# Patient Record
Sex: FEMALE | Race: WHITE | NOT HISPANIC OR LATINO | Employment: OTHER | URBAN - NONMETROPOLITAN AREA
[De-identification: names, ages, dates, MRNs, and addresses within clinical notes are randomized per-mention and may not be internally consistent; named-entity substitution may affect disease eponyms.]

---

## 2017-07-11 ENCOUNTER — TELEPHONE (OUTPATIENT)
Dept: CARDIOLOGY | Facility: CLINIC | Age: 82
End: 2017-07-11

## 2017-07-11 NOTE — TELEPHONE ENCOUNTER
Patient called to ask when her next appointment is scheduled. Patient was told that it is scheduled for 7/27/17 at 2:45 pm. Patient verbalized understanding.

## 2017-07-27 ENCOUNTER — OFFICE VISIT (OUTPATIENT)
Dept: CARDIOLOGY | Facility: CLINIC | Age: 82
End: 2017-07-27

## 2017-07-27 VITALS
HEART RATE: 64 BPM | SYSTOLIC BLOOD PRESSURE: 130 MMHG | HEIGHT: 64 IN | WEIGHT: 148 LBS | DIASTOLIC BLOOD PRESSURE: 74 MMHG | BODY MASS INDEX: 25.27 KG/M2

## 2017-07-27 DIAGNOSIS — E78.49 OTHER HYPERLIPIDEMIA: ICD-10-CM

## 2017-07-27 DIAGNOSIS — Z95.2 S/P AVR: ICD-10-CM

## 2017-07-27 DIAGNOSIS — I10 ESSENTIAL HYPERTENSION: Primary | ICD-10-CM

## 2017-07-27 DIAGNOSIS — I35.0 NONRHEUMATIC AORTIC VALVE STENOSIS: ICD-10-CM

## 2017-07-27 PROBLEM — E78.5 HYPERLIPEMIA: Status: ACTIVE | Noted: 2017-07-27

## 2017-07-27 PROCEDURE — 99213 OFFICE O/P EST LOW 20 MIN: CPT | Performed by: NURSE PRACTITIONER

## 2017-07-27 NOTE — PROGRESS NOTES
Chief Complaint   Patient presents with   • Follow-up     Chest pain on Saturday, felt like indigestion.  Became SOB.  Contributed to being out in the heat at a family reunion.  Lasted about 15-20 minutes.    • Med Refill     PCP writes all her refills and moniter labs.        Cardiac Complaints  none      Subjective   Laurel Bianchi is a 82 y.o. female with a history of aortic stenosis who underwent aortic valve replacement in 2008.  Most recent echo was reported as stable in 2016. Today she returns to the office for a follow-up appointment and reports chest discomfort associated with meals and certain foods.  She had an episode last Saturday with chest pain and shortness of breath, but admits to eating foods that often cause reflux and chest discomfort. She says the event lasted for several minutes and then went away. She denies any concerns since that time and states she is able to do all her housework at home without concerns and does so without any problems.  Labs and refills she reports with PCP.      Cardiac History  Past Surgical History:   Procedure Laterality Date   • CARDIAC CATHETERIZATION  08/26/2008     Cath- Normal Coronaries. GUANAKITO- 1 Cm2, Mod AI, Dil AO    • CARDIOVASCULAR STRESS TEST  11/15/2011     L. Myoview- Negative    • CARDIOVASCULAR STRESS TEST  06/05/2013    L. Myoview- negative for ischemia    • CONVERTED (HISTORICAL) HOLTER  02/28/2012     Holter- AVG HR HR 65 BPM.    • ECHO - CONVERTED  08/25/2008    Echo- ([facility]) EF 65%. GUANAKITO- 0.8 Cm2    • ECHO - CONVERTED  06/02/2009    Echo- EF >60%, GUANAKITO 1.7cm2.    • ECHO - CONVERTED  09/14/2010     Echo- EF >60%. GUANAKITO 1.5cm2,    • ECHO - CONVERTED  10/12/2011    Echo- EF 65%. GUANAKITO- 1.5 Cm2    • ECHO - CONVERTED  02/14/2013    Echo- EF 65%. GUANAKITO- 1.5 Cm2    • ECHO - CONVERTED  05/12/2015    Echo- EF 65%, mod AS, mild to mod MR, RVSP 28 mmHg    • ECHO - CONVERTED  11/17/2016    EF 60-65%, mild AS (GUANAKITO 1.73), trace MR   • OTHER SURGICAL HISTORY  08/29/2008     AVR with #21 Pericardial Valve & AO Root replacment with 28mm hemashield graft    • OTHER SURGICAL HISTORY  09/04/2008     R-thoracentesis    • OTHER SURGICAL HISTORY  05/31/2013    Carotid U/S-no significant hemodynamic stenosis, < 50%   • OTHER SURGICAL HISTORY  2013    Telerhythmics- Sinus rhythm with ave HR 77 bpm.        Current Outpatient Prescriptions   Medication Sig Dispense Refill   • amLODIPine (NORVASC) 5 MG tablet Take 5 mg by mouth Daily.     • aspirin 81 MG EC tablet Take 81 mg by mouth Daily.     • calcitriol (ROCALTROL) 0.25 MCG capsule Take 0.25 mcg by mouth Daily.     • Calcium Carb-Cholecalciferol (CALCIUM + D3) 600-200 MG-UNIT tablet Take  by mouth 4 (Four) Times a Day.     • hydrochlorothiazide (HYDRODIURIL) 25 MG tablet Take 25 mg by mouth. Take 1/2 tab daily     • lisinopril (PRINIVIL,ZESTRIL) 20 MG tablet Take 20 mg by mouth Daily.     • LORazepam (ATIVAN) 0.5 MG tablet Take 0.5 mg by mouth 2 (Two) Times a Day As Needed for anxiety.     • meclizine 25 MG chewable tablet chewable tablet Chew 25 mg As Needed.     • metoprolol tartrate (LOPRESSOR) 50 MG tablet Take 50 mg by mouth Daily.     • Omega-3 Fatty Acids (FISH OIL) 1200 MG capsule delayed-release Take  by mouth Daily.     • omeprazole (priLOSEC) 40 MG capsule Take 40 mg by mouth Daily.     • rosuvastatin (CRESTOR) 10 MG tablet Take  by mouth Daily.       No current facility-administered medications for this visit.        Review of patient's allergies indicates no known allergies.    Past Medical History:   Diagnosis Date   • Abnormal PFT     3-19-09 PFT -- abnormal --- followed by Dr. Givens   • Abnormal PFT     7-07-09: PFT-- Mild restrictive vent. defect. Insig. BD response.   • Anxiety and depression    • History of skin cancer     resection of the nose.   • Hypercholesterolemia    • Hypertension    • Migraines    • Partial Thyroidectomy    • Syncope     with severe aortic stenosis, S/P AVR       Social History     Social History  "  • Marital status:      Spouse name: N/A   • Number of children: N/A   • Years of education: N/A     Occupational History   • Not on file.     Social History Main Topics   • Smoking status: Never Smoker   • Smokeless tobacco: Never Used   • Alcohol use No   • Drug use: No   • Sexual activity: Not on file     Other Topics Concern   • Not on file     Social History Narrative       Family History   Problem Relation Age of Onset   • Heart disease Mother    • Hypertension Mother    • Diabetes Mother    • Heart disease Father    • Other Father      lung problems   • Heart disease Other    • Hypertension Other    • Hyperlipidemia Other        Review of Systems   Constitution: Negative for malaise/fatigue and night sweats.   Cardiovascular: Negative for chest pain, dyspnea on exertion, near-syncope and palpitations.   Respiratory: Negative for cough and shortness of breath.    Musculoskeletal: Negative for arthritis and back pain.   Gastrointestinal: Positive for heartburn. Negative for anorexia, nausea and vomiting.   Neurological: Negative for dizziness, focal weakness and light-headedness.   Psychiatric/Behavioral: Negative for altered mental status and depression.       DiabetesNo  Thyroidnormal    Objective     /74  Pulse 64  Ht 64\" (162.6 cm)  Wt 148 lb (67.1 kg)  BMI 25.4 kg/m2    Physical Exam   Constitutional: She is oriented to person, place, and time. She appears well-developed and well-nourished.   HENT:   Head: Normocephalic and atraumatic.   Eyes: EOM are normal. Pupils are equal, round, and reactive to light.   Neck: Normal range of motion. Neck supple.   Cardiovascular: Normal rate and regular rhythm.    Murmur heard.  Pulmonary/Chest: Effort normal and breath sounds normal.   Abdominal: Soft.   Musculoskeletal: Normal range of motion.   Neurological: She is alert and oriented to person, place, and time.   Skin: Skin is warm and dry.   Psychiatric: She has a normal mood and affect. Her " behavior is normal.       Procedures    Assessment/Plan     HR and BP are stable.  No changes to meds will be made.  She ws encouraged to continue use of her prilosec for GERD management and to avoid foods she knows causes her concern.  Most recent echo findings discussed with patient.  No new cardiac testing will be advised at this time as no new concerns are voiced and she states doing well at home without concern.  Labs she reports with you, could we get next copy?  No refills are needed as she reports with you.  Good cardiac diet and activity as tolerated advised.  6 month follow up advised or sooner if needed.  If any problems should arise, patient advised to call the office.      Problems Addressed this Visit        Cardiovascular and Mediastinum    Essential hypertension - Primary    Hyperlipemia    AS (aortic stenosis)    S/P AVR                  Electronically signed by SILME Lei July 27, 2017 4:54 PM

## 2018-01-25 ENCOUNTER — OFFICE VISIT (OUTPATIENT)
Dept: CARDIOLOGY | Facility: CLINIC | Age: 83
End: 2018-01-25

## 2018-01-25 VITALS
SYSTOLIC BLOOD PRESSURE: 156 MMHG | HEIGHT: 64 IN | WEIGHT: 142 LBS | DIASTOLIC BLOOD PRESSURE: 70 MMHG | HEART RATE: 60 BPM | BODY MASS INDEX: 24.24 KG/M2

## 2018-01-25 DIAGNOSIS — R42 VERTIGO: ICD-10-CM

## 2018-01-25 DIAGNOSIS — E78.49 OTHER HYPERLIPIDEMIA: ICD-10-CM

## 2018-01-25 DIAGNOSIS — I35.0 NONRHEUMATIC AORTIC VALVE STENOSIS: ICD-10-CM

## 2018-01-25 DIAGNOSIS — K30 INDIGESTION: ICD-10-CM

## 2018-01-25 DIAGNOSIS — Z95.2 S/P AVR: ICD-10-CM

## 2018-01-25 DIAGNOSIS — I10 ESSENTIAL HYPERTENSION: Primary | ICD-10-CM

## 2018-01-25 PROCEDURE — 99214 OFFICE O/P EST MOD 30 MIN: CPT | Performed by: NURSE PRACTITIONER

## 2018-01-25 RX ORDER — ESOMEPRAZOLE MAGNESIUM 40 MG/1
40 CAPSULE, DELAYED RELEASE ORAL
COMMUNITY
End: 2020-08-19 | Stop reason: ALTCHOICE

## 2018-01-25 NOTE — PROGRESS NOTES
Chief Complaint   Patient presents with   • Follow-up     Patient presents for cardiac management. PCP refills meds and follows labs.    • Chest Pain     Has occasional stomach pain that she thinks is related to indigestion. Pain is relieved with tums.        Cardiac Complaints  none      Subjective   Laurel Bianchi is a 82 y.o. female with HTN, hyperlipidemia, and  a history of aortic stenosis who underwent aortic valve replacement in 2008.  Most recent echo in 2016 showed GUANAKITO stable at 1.7 cm2 and normal LV function. She returns today for follow up and states issues with pain under her ribs on the left side, which she states is left upper quadrant .  Patient reports it as gas pain as she states it goes quickly away with TUMS.  She denies chest pain, shortness of breath, and palpitations.  She does have some issues with what she says is dizziness from vertigo but patient says she has been doing exercise with PT in regards and says it has helped the dizziness a great deal.  Labs and refills she reports with PCP.  Patient thinks everything looked okay an no abnormalities were seen.  No refills are needed today.      Cardiac History  Past Surgical History:   Procedure Laterality Date   • CARDIAC CATHETERIZATION  08/26/2008     Cath- Normal Coronaries. GUANAKITO- 1 Cm2, Mod AI, Dil AO    • CARDIOVASCULAR STRESS TEST  11/15/2011     L. Myoview- Negative    • CARDIOVASCULAR STRESS TEST  06/05/2013    L. Myoview- negative for ischemia    • CONVERTED (HISTORICAL) HOLTER  02/28/2012     Holter- AVG HR HR 65 BPM.    • ECHO - CONVERTED  08/25/2008    Echo- ([facility]) EF 65%. GUANAKITO- 0.8 Cm2    • ECHO - CONVERTED  06/02/2009    Echo- EF >60%, GUANAKITO 1.7cm2.    • ECHO - CONVERTED  09/14/2010     Echo- EF >60%. GUANAKITO 1.5cm2,    • ECHO - CONVERTED  10/12/2011    Echo- EF 65%. GUANAKITO- 1.5 Cm2    • ECHO - CONVERTED  02/14/2013    Echo- EF 65%. GUANAKITO- 1.5 Cm2    • ECHO - CONVERTED  05/12/2015    Echo- EF 65%, mod AS, mild to mod MR, RVSP 28 mmHg    • ECHO  - CONVERTED  11/17/2016    EF 60-65%, mild AS (GUANAKITO 1.73), trace MR   • OTHER SURGICAL HISTORY  08/29/2008    AVR with #21 Pericardial Valve & AO Root replacment with 28mm hemashield graft    • OTHER SURGICAL HISTORY  09/04/2008     R-thoracentesis    • OTHER SURGICAL HISTORY  05/31/2013    Carotid U/S-no significant hemodynamic stenosis, < 50%   • OTHER SURGICAL HISTORY  2013    Telerhythmics- Sinus rhythm with ave HR 77 bpm.        Current Outpatient Prescriptions   Medication Sig Dispense Refill   • amLODIPine (NORVASC) 5 MG tablet Take 5 mg by mouth Daily.     • aspirin 81 MG EC tablet Take 81 mg by mouth Daily.     • calcitriol (ROCALTROL) 0.25 MCG capsule Take 0.25 mcg by mouth. Takes 2 capsules daily     • Calcium Carb-Cholecalciferol (CALCIUM + D3) 600-200 MG-UNIT tablet Take  by mouth 4 (Four) Times a Day.     • esomeprazole (nexIUM) 40 MG capsule Take 40 mg by mouth Every Morning Before Breakfast.     • hydrochlorothiazide (HYDRODIURIL) 25 MG tablet Take 25 mg by mouth. Take 1/2 tab daily     • lisinopril (PRINIVIL,ZESTRIL) 20 MG tablet Take 20 mg by mouth Daily.     • LORazepam (ATIVAN) 0.5 MG tablet Take 0.5 mg by mouth 2 (Two) Times a Day As Needed for anxiety.     • meclizine 25 MG chewable tablet chewable tablet Chew 25 mg As Needed.     • metoprolol tartrate (LOPRESSOR) 50 MG tablet Take 50 mg by mouth Daily.     • Omega-3 Fatty Acids (FISH OIL) 1200 MG capsule delayed-release Take  by mouth Daily.     • rosuvastatin (CRESTOR) 10 MG tablet Take  by mouth Daily.       No current facility-administered medications for this visit.        Review of patient's allergies indicates no known allergies.    Past Medical History:   Diagnosis Date   • Abnormal PFT     3-19-09 PFT -- abnormal --- followed by Dr. Givens   • Abnormal PFT     7-07-09: PFT-- Mild restrictive vent. defect. Insig. BD response.   • Anxiety and depression    • History of skin cancer     resection of the nose.   • Hypercholesterolemia   "  • Hypertension    • Migraines    • Partial Thyroidectomy    • Syncope     with severe aortic stenosis, S/P AVR       Social History     Social History   • Marital status:      Spouse name: N/A   • Number of children: N/A   • Years of education: N/A     Occupational History   • Not on file.     Social History Main Topics   • Smoking status: Never Smoker   • Smokeless tobacco: Never Used   • Alcohol use No   • Drug use: No   • Sexual activity: Not on file     Other Topics Concern   • Not on file     Social History Narrative       Family History   Problem Relation Age of Onset   • Heart disease Mother    • Hypertension Mother    • Diabetes Mother    • Heart disease Father    • Other Father      lung problems   • Heart disease Other    • Hypertension Other    • Hyperlipidemia Other        Review of Systems   Constitution: Negative for weakness and malaise/fatigue.   Cardiovascular: Negative for chest pain, dyspnea on exertion, near-syncope, palpitations and syncope.   Respiratory: Negative for shortness of breath and wheezing.    Musculoskeletal: Negative for joint pain and joint swelling.   Gastrointestinal: Positive for heartburn. Negative for anorexia, diarrhea, dysphagia and nausea.   Genitourinary: Negative for dysuria, hematuria and nocturia.   Neurological: Positive for dizziness. Negative for light-headedness and loss of balance.   Psychiatric/Behavioral: Negative for depression and memory loss. The patient is not nervous/anxious.        DiabetesNo  Thyroidnormal    Objective     /70 (BP Location: Left arm)  Pulse 60  Ht 162.6 cm (64\")  Wt 64.4 kg (142 lb)  BMI 24.37 kg/m2    Physical Exam   Constitutional: She is oriented to person, place, and time. She appears well-developed and well-nourished.   HENT:   Head: Normocephalic and atraumatic.   Eyes: EOM are normal. Pupils are equal, round, and reactive to light.   Neck: Normal range of motion. Neck supple.   Cardiovascular: Normal rate and " regular rhythm.    Murmur heard.  Pulmonary/Chest: Effort normal and breath sounds normal.   Abdominal: Soft.   Musculoskeletal: Normal range of motion.   Neurological: She is alert and oriented to person, place, and time.   Skin: Skin is warm and dry.   Psychiatric: She has a normal mood and affect. Her behavior is normal.       Procedures    Assessment/Plan     HR is stable today.  BP is high side of normal at 156/70.  Patient advised to increase her norvasc to 1 and 1/2 tablet daily if her blood pressure continues to be high.  She was urged to keep a log at home. Limited sodium intake advised, DASH diet information provided. Most recent echo showed GUANAKITO as stable at 1.7 cm2, at next visit we will consider echo to reassess her GUANAKITO and LV function.  For now, since no new or worsening concerns noted, no new cardiac workup advised.  If symptoms should arise, patient advised to call for further recommendations.  In regards to vertigo, she is currently following with PT in regards and states her exercises have helped with her dizziness. She continues to have indigestion and epigastric pain.  She was encouraged to discuss with you about Hpylori testing if it has not yet been done.  Labs are done with your office, could we have most recent copy for our records?  No refills needed.  Weight is down from last visit by 6 pounds, she states she has not been eating as much from stress and her vertigo.  She will discuss with you.  6 month follow up advised or sooner if needed.        Problems Addressed this Visit        Cardiovascular and Mediastinum    Essential hypertension - Primary    Hyperlipemia    AS (aortic stenosis)    S/P AVR      Other Visit Diagnoses     Vertigo        Indigestion                      Electronically signed by SLIME Lei January 26, 2018 8:42 AM

## 2018-07-26 ENCOUNTER — OFFICE VISIT (OUTPATIENT)
Dept: CARDIOLOGY | Facility: CLINIC | Age: 83
End: 2018-07-26

## 2018-07-26 VITALS
HEIGHT: 64 IN | SYSTOLIC BLOOD PRESSURE: 120 MMHG | BODY MASS INDEX: 24.07 KG/M2 | WEIGHT: 141 LBS | HEART RATE: 60 BPM | DIASTOLIC BLOOD PRESSURE: 70 MMHG

## 2018-07-26 DIAGNOSIS — I10 ESSENTIAL HYPERTENSION: ICD-10-CM

## 2018-07-26 DIAGNOSIS — I35.0 NONRHEUMATIC AORTIC VALVE STENOSIS: ICD-10-CM

## 2018-07-26 DIAGNOSIS — R01.1 CARDIAC MURMUR: Primary | ICD-10-CM

## 2018-07-26 DIAGNOSIS — E78.2 MIXED HYPERLIPIDEMIA: ICD-10-CM

## 2018-07-26 DIAGNOSIS — Z95.2 S/P AVR: ICD-10-CM

## 2018-07-26 DIAGNOSIS — R42 DIZZINESS: ICD-10-CM

## 2018-07-26 DIAGNOSIS — R06.02 SHORTNESS OF BREATH: ICD-10-CM

## 2018-07-26 DIAGNOSIS — I34.0 NON-RHEUMATIC MITRAL REGURGITATION: ICD-10-CM

## 2018-07-26 PROCEDURE — 99214 OFFICE O/P EST MOD 30 MIN: CPT | Performed by: NURSE PRACTITIONER

## 2018-07-26 NOTE — PROGRESS NOTES
Chief Complaint   Patient presents with   • Follow-up     For cardiac management. Labs per PCP about 3 months ago. Refills per PCP.    • Shortness of Breath     Same as before.        Subjective       Laurel Bianchi is a 83 y.o. female with HTN, hyperlipidemia, and  a history of aortic stenosis who underwent aortic valve replacement in 2008.  Most recent echo in 2016 showed GUANAKITO stable at 1.7 cm2 and normal LV function.  Today she comes the office for a follow up visit. She now lives where she can do more walking outside. She admits to shortness of breath with exertion. She also has issues with dizziness but unsure if related to vertigo. After treatment with physical therapy in the past her dizziness had improved but not subsided. No recent medication changes noted.     HPI     Cardiac History:    Past Surgical History:   Procedure Laterality Date   • CARDIAC CATHETERIZATION  08/26/2008     Cath- Normal Coronaries. GUANAKITO- 1 Cm2, Mod AI, Dil AO    • CARDIOVASCULAR STRESS TEST  11/15/2011     L. Myoview- Negative    • CARDIOVASCULAR STRESS TEST  06/05/2013    L. Myoview- negative for ischemia    • CONVERTED (HISTORICAL) HOLTER  02/28/2012     Holter- AVG HR HR 65 BPM.    • ECHO - CONVERTED  08/25/2008    Echo- ([facility]) EF 65%. GUANAKITO- 0.8 Cm2    • ECHO - CONVERTED  06/02/2009    Echo- EF >60%, GUANAKITO 1.7cm2.    • ECHO - CONVERTED  09/14/2010     Echo- EF >60%. GUANAKITO 1.5cm2,    • ECHO - CONVERTED  10/12/2011    Echo- EF 65%. GUANAKITO- 1.5 Cm2    • ECHO - CONVERTED  02/14/2013    Echo- EF 65%. GUANAKITO- 1.5 Cm2    • ECHO - CONVERTED  05/12/2015    Echo- EF 65%, mod AS, mild to mod MR, RVSP 28 mmHg    • ECHO - CONVERTED  11/17/2016    EF 60-65%, mild AS (GUANAKITO 1.73), trace MR   • OTHER SURGICAL HISTORY  08/29/2008    AVR with #21 Pericardial Valve & AO Root replacment with 28mm hemashield graft    • OTHER SURGICAL HISTORY  09/04/2008     R-thoracentesis    • OTHER SURGICAL HISTORY  05/31/2013    Carotid U/S-no significant hemodynamic stenosis, <  50%   • OTHER SURGICAL HISTORY  2013    Telerhythmics- Sinus rhythm with ave HR 77 bpm.        Current Outpatient Prescriptions   Medication Sig Dispense Refill   • amLODIPine (NORVASC) 5 MG tablet Take 5 mg by mouth Daily.     • aspirin 81 MG EC tablet Take 81 mg by mouth Daily.     • calcitriol (ROCALTROL) 0.25 MCG capsule Take 0.25 mcg by mouth. Takes 2 capsules daily     • Calcium Carb-Cholecalciferol (CALCIUM + D3) 600-200 MG-UNIT tablet Take  by mouth 4 (Four) Times a Day.     • esomeprazole (nexIUM) 40 MG capsule Take 40 mg by mouth Every Morning Before Breakfast.     • hydrochlorothiazide (HYDRODIURIL) 25 MG tablet Take 25 mg by mouth. Take 1/2 tab daily     • lisinopril (PRINIVIL,ZESTRIL) 20 MG tablet Take 20 mg by mouth Daily.     • LORazepam (ATIVAN) 0.5 MG tablet Take 0.5 mg by mouth 2 (Two) Times a Day As Needed for anxiety.     • metoprolol tartrate (LOPRESSOR) 50 MG tablet Take 50 mg by mouth Daily.     • Omega-3 Fatty Acids (FISH OIL) 1200 MG capsule delayed-release Take  by mouth Daily.     • rosuvastatin (CRESTOR) 10 MG tablet Take  by mouth Daily.       No current facility-administered medications for this visit.        Patient has no known allergies.    Past Medical History:   Diagnosis Date   • Abnormal PFT     3-19-09 PFT -- abnormal --- followed by Dr. Givens   • Abnormal PFT     7-07-09: PFT-- Mild restrictive vent. defect. Insig. BD response.   • Anxiety and depression    • History of skin cancer     resection of the nose.   • Hypercholesterolemia    • Hypertension    • Migraines    • Partial Thyroidectomy    • Syncope     with severe aortic stenosis, S/P AVR       Social History     Social History   • Marital status:      Spouse name: N/A   • Number of children: N/A   • Years of education: N/A     Occupational History   • Not on file.     Social History Main Topics   • Smoking status: Never Smoker   • Smokeless tobacco: Never Used   • Alcohol use No   • Drug use: No   • Sexual  "activity: Not on file     Other Topics Concern   • Not on file     Social History Narrative   • No narrative on file       Family History   Problem Relation Age of Onset   • Heart disease Mother    • Hypertension Mother    • Diabetes Mother    • Heart disease Father    • Other Father         lung problems   • Heart disease Other    • Hypertension Other    • Hyperlipidemia Other        Review of Systems   Constitution: Positive for malaise/fatigue (same, relates to age). Negative for decreased appetite.   HENT: Negative for congestion and nosebleeds.    Eyes: Negative for redness and visual disturbance.   Cardiovascular: Positive for chest pain (very rare \"little pain\" , not a new symptom). Negative for dyspnea on exertion, leg swelling and near-syncope.   Respiratory: Positive for shortness of breath.    Endocrine: Negative for polydipsia, polyphagia and polyuria.   Hematologic/Lymphatic: Negative for bleeding problem. Does not bruise/bleed easily.   Skin: Negative for dry skin and itching.   Musculoskeletal: Negative for falls, muscle cramps and muscle weakness.   Gastrointestinal: Negative for abdominal pain, heartburn, melena and nausea.   Genitourinary: Negative for dysuria and hematuria.   Neurological: Positive for vertigo (has had PT with benefit). Negative for headaches and light-headedness.   Psychiatric/Behavioral: Positive for memory loss (more forgetful). The patient is not nervous/anxious.         Objective     /70   Pulse 60   Ht 162.6 cm (64\")   Wt 64 kg (141 lb)   BMI 24.20 kg/m²     Physical Exam   Constitutional: She is oriented to person, place, and time. She appears well-developed and well-nourished.   HENT:   Head: Normocephalic.   Eyes: Pupils are equal, round, and reactive to light. Conjunctivae are normal.   Neck: Normal range of motion. Neck supple.   Cardiovascular: Normal rate, regular rhythm, S1 normal and S2 normal.    Murmur heard.   Medium-pitched blowing systolic murmur is " present with a grade of 3/6   Pulmonary/Chest: Breath sounds normal.   Abdominal: Soft. Bowel sounds are normal. There is no tenderness.   Musculoskeletal: Normal range of motion. She exhibits no edema.   Neurological: She is alert and oriented to person, place, and time.   Skin: Skin is warm and dry.   Psychiatric: She has a normal mood and affect. Her behavior is normal.      Procedures: none today        Assessment/Plan      Laurel was seen today for follow-up and shortness of breath.    Diagnoses and all orders for this visit:    Cardiac murmur  -     Adult Transthoracic Echo Complete W/ Cont if Necessary Per Protocol; Future    Non-rheumatic mitral regurgitation    S/P AVR  -     Adult Transthoracic Echo Complete W/ Cont if Necessary Per Protocol; Future    Nonrheumatic aortic valve stenosis  -     Adult Transthoracic Echo Complete W/ Cont if Necessary Per Protocol; Future    Essential hypertension  -     Adult Transthoracic Echo Complete W/ Cont if Necessary Per Protocol; Future    Mixed hyperlipidemia    Dizziness  -     Adult Transthoracic Echo Complete W/ Cont if Necessary Per Protocol; Future    Shortness of breath  -     Adult Transthoracic Echo Complete W/ Cont if Necessary Per Protocol; Future     Her blood pressure today is normal. Heart rate and rhythm are normal. Continue same dose Lopressor, lisinopril, HCTZ and Norvasc. No refills needed today.     Thank you for sending a copy of lab report. Her lipids are well controlled. LFT normal. Continue same dose Crestor.     Patient's Body mass index is 24.2 kg/m². BMI is within normal parameters. No follow-up required. Heart healthy diet encouraged.     Due to shortness of breath and dizziness and history of AVR, a repeat echocardiogram ordered to reassess valvular structures and overall LV function.     A 6 month follow up scheduled.            Electronically signed by SLIME Alvarez,  July 26, 2018 6:21 PM

## 2018-08-02 ENCOUNTER — HOSPITAL ENCOUNTER (OUTPATIENT)
Dept: CARDIOLOGY | Facility: HOSPITAL | Age: 83
Discharge: HOME | End: 2018-08-02
Admitting: NURSE PRACTITIONER

## 2018-08-02 DIAGNOSIS — R06.02 SHORTNESS OF BREATH: ICD-10-CM

## 2018-08-02 DIAGNOSIS — R42 DIZZINESS: ICD-10-CM

## 2018-08-02 DIAGNOSIS — I35.0 NONRHEUMATIC AORTIC VALVE STENOSIS: ICD-10-CM

## 2018-08-02 DIAGNOSIS — I10 ESSENTIAL HYPERTENSION: ICD-10-CM

## 2018-08-02 DIAGNOSIS — R01.1 CARDIAC MURMUR: ICD-10-CM

## 2018-08-02 DIAGNOSIS — Z95.2 S/P AVR: ICD-10-CM

## 2018-08-02 LAB
MAXIMAL PREDICTED HEART RATE: 137 BPM
STRESS TARGET HR: 116 BPM

## 2018-08-02 PROCEDURE — 93306 TTE W/DOPPLER COMPLETE: CPT

## 2018-08-02 PROCEDURE — 93306 TTE W/DOPPLER COMPLETE: CPT | Performed by: INTERNAL MEDICINE

## 2019-01-28 ENCOUNTER — OFFICE VISIT (OUTPATIENT)
Dept: CARDIOLOGY | Facility: CLINIC | Age: 84
End: 2019-01-28

## 2019-01-28 VITALS
BODY MASS INDEX: 24.59 KG/M2 | HEIGHT: 64 IN | WEIGHT: 144 LBS | SYSTOLIC BLOOD PRESSURE: 124 MMHG | DIASTOLIC BLOOD PRESSURE: 60 MMHG | HEART RATE: 64 BPM

## 2019-01-28 DIAGNOSIS — I10 ESSENTIAL HYPERTENSION: ICD-10-CM

## 2019-01-28 DIAGNOSIS — R42 DIZZINESS: ICD-10-CM

## 2019-01-28 DIAGNOSIS — I35.0 NONRHEUMATIC AORTIC VALVE STENOSIS: Primary | ICD-10-CM

## 2019-01-28 DIAGNOSIS — Z95.2 S/P AVR: ICD-10-CM

## 2019-01-28 DIAGNOSIS — E78.2 MIXED HYPERLIPIDEMIA: ICD-10-CM

## 2019-01-28 PROCEDURE — 99213 OFFICE O/P EST LOW 20 MIN: CPT | Performed by: NURSE PRACTITIONER

## 2019-01-28 NOTE — PROGRESS NOTES
"Chief Complaint   Patient presents with   • Follow-up     Cardiac management. She thinks she had labs 3 months ago. PCP writes refills.   • Chest Pain     Having some aching pressure to mid/left chest area, she thinks could be indigestion.   • Palpitations     Having some occasional, not often.   • Dizziness     She states \"I stay dizzy all the time\".        Subjective       Laurel Bianchi is a 83 y.o. female with HTN, hyperlipidemia, and  a history of aortic stenosis who underwent aortic valve replacement in 2008.  Echo in 2016 showed GUANAKITO stable at 1.7 cm2 and normal LV function. On 8/2/18, echo was repeated due to dizziness and shortness of breath. LVEF 55-60%, bioprosthetic pericardial aortic valve with normal function, GUANAKITO 1.3 cm2 and  Mild MVP noted. RVSP was 32 mmHg.     Today she comes to the office for a follow up visit and no cardiac concerns are voiced. She has dizziness, which improved after physical therapy treatment for vertigo. No exertional chest pain or increased shortness of breath noted. Rarely feels brief palpitations.      HPI     Cardiac History:    Past Surgical History:   Procedure Laterality Date   • CARDIAC CATHETERIZATION  08/26/2008     Cath- Normal Coronaries. GUANAKITO- 1 Cm2, Mod AI, Dil AO    • CARDIOVASCULAR STRESS TEST  11/15/2011     L. Myoview- Negative    • CARDIOVASCULAR STRESS TEST  06/05/2013    L. Myoview- negative for ischemia    • CONVERTED (HISTORICAL) HOLTER  02/28/2012     Holter- AVG HR HR 65 BPM.    • ECHO - CONVERTED  08/25/2008    Echo- ([facility]) EF 65%. GUANAKITO- 0.8 Cm2    • ECHO - CONVERTED  06/02/2009    Echo- EF >60%, GUANAKITO 1.7cm2.    • ECHO - CONVERTED  09/14/2010     Echo- EF >60%. GUANAKITO 1.5cm2,    • ECHO - CONVERTED  10/12/2011    Echo- EF 65%. GUANAKITO- 1.5 Cm2    • ECHO - CONVERTED  02/14/2013    Echo- EF 65%. GUANAKITO- 1.5 Cm2    • ECHO - CONVERTED  05/12/2015    Echo- EF 65%, mod AS, mild to mod MR, RVSP 28 mmHg    • ECHO - CONVERTED  11/17/2016    EF 60-65%, mild AS (GUANAKITO 1.73), trace " MR   • ECHO - CONVERTED  08/02/2018    EF 55-60%. GUANAKITO- 1.3 Cm2. RVSP- 33 mmHg.   • OTHER SURGICAL HISTORY  08/29/2008    AVR with #21 Pericardial Valve & AO Root replacment with 28mm hemashield graft    • OTHER SURGICAL HISTORY  09/04/2008     R-thoracentesis    • OTHER SURGICAL HISTORY  05/31/2013    Carotid U/S-no significant hemodynamic stenosis, < 50%   • OTHER SURGICAL HISTORY  2013    Telerhythmics- Sinus rhythm with ave HR 77 bpm.        Current Outpatient Medications   Medication Sig Dispense Refill   • amLODIPine (NORVASC) 5 MG tablet Take 5 mg by mouth Daily.     • aspirin 81 MG EC tablet Take 81 mg by mouth Daily.     • calcitriol (ROCALTROL) 0.25 MCG capsule Takes 2 capsules daily     • Calcium Carb-Cholecalciferol (CALCIUM + D3) 600-200 MG-UNIT tablet Take  by mouth 4 (Four) Times a Day.     • esomeprazole (nexIUM) 40 MG capsule Take 40 mg by mouth Every Morning Before Breakfast.     • hydrochlorothiazide (HYDRODIURIL) 25 MG tablet Take 25 mg by mouth Daily.     • lisinopril (PRINIVIL,ZESTRIL) 20 MG tablet Take 20 mg by mouth Daily.     • LORazepam (ATIVAN) 0.5 MG tablet Take 0.5 mg by mouth 2 (Two) Times a Day As Needed for anxiety.     • metoprolol tartrate (LOPRESSOR) 50 MG tablet Take 50 mg by mouth Daily.     • Omega-3 Fatty Acids (FISH OIL) 1200 MG capsule delayed-release Take  by mouth Daily.     • rosuvastatin (CRESTOR) 10 MG tablet Take  by mouth Daily.       No current facility-administered medications for this visit.        Patient has no known allergies.    Past Medical History:   Diagnosis Date   • Abnormal PFT     3-19-09 PFT -- abnormal --- followed by Dr. Givens   • Abnormal PFT     7-07-09: PFT-- Mild restrictive vent. defect. Insig. BD response.   • Anxiety and depression    • History of skin cancer     resection of the nose.   • Hypercholesterolemia    • Hypertension    • Migraines    • Partial Thyroidectomy    • Syncope     with severe aortic stenosis, S/P AVR       Social History      Socioeconomic History   • Marital status:      Spouse name: Not on file   • Number of children: Not on file   • Years of education: Not on file   • Highest education level: Not on file   Social Needs   • Financial resource strain: Not on file   • Food insecurity - worry: Not on file   • Food insecurity - inability: Not on file   • Transportation needs - medical: Not on file   • Transportation needs - non-medical: Not on file   Occupational History   • Not on file   Tobacco Use   • Smoking status: Never Smoker   • Smokeless tobacco: Never Used   Substance and Sexual Activity   • Alcohol use: No   • Drug use: No   • Sexual activity: Not on file   Other Topics Concern   • Not on file   Social History Narrative   • Not on file       Family History   Problem Relation Age of Onset   • Heart disease Mother    • Hypertension Mother    • Diabetes Mother    • Heart disease Father    • Other Father         lung problems   • Heart disease Other    • Hypertension Other    • Hyperlipidemia Other        Review of Systems   Constitution: Negative for decreased appetite and malaise/fatigue.   HENT: Negative for congestion, hoarse voice and nosebleeds.    Eyes: Negative for blurred vision and redness.   Cardiovascular: Positive for palpitations. Negative for chest pain and leg swelling.   Respiratory: Negative for shortness of breath and snoring.    Endocrine: Negative for polydipsia, polyphagia and polyuria.   Hematologic/Lymphatic: Negative for adenopathy. Does not bruise/bleed easily.   Skin: Negative for color change, dry skin and itching.   Musculoskeletal: Negative for falls, muscle cramps and myalgias.   Gastrointestinal: Positive for heartburn (mild, not often). Negative for abdominal pain, change in bowel habit, dysphagia, melena and nausea.   Genitourinary: Negative for dysuria (awhile back, not recent) and hematuria.   Neurological: Positive for vertigo (better since had PT). Negative for dizziness and  "light-headedness.   Psychiatric/Behavioral: Negative for altered mental status. The patient does not have insomnia.    Allergic/Immunologic: Negative for environmental allergies and hives.        Objective     /60 (BP Location: Right arm)   Pulse 64   Ht 162.6 cm (64.02\")   Wt 65.3 kg (144 lb)   BMI 24.71 kg/m²     Physical Exam   Constitutional: She is oriented to person, place, and time. She appears well-nourished.   HENT:   Head: Normocephalic.   Eyes: Pupils are equal, round, and reactive to light.   Neck: Normal range of motion. No JVD present. Carotid bruit is not present.   Cardiovascular: Normal rate, regular rhythm, S1 normal, S2 normal and normal pulses.   Murmur heard.   Harsh midsystolic murmur is present with a grade of 3/6 at the upper right sternal border radiating to the neck.  Pulmonary/Chest: Breath sounds normal. She has no rales.   Abdominal: Soft. Bowel sounds are normal. She exhibits no distension. There is no tenderness.   Musculoskeletal: Normal range of motion. She exhibits no edema.   Neurological: She is alert and oriented to person, place, and time.   Skin: Skin is warm and dry. No pallor.   Psychiatric: She has a normal mood and affect. Her speech is normal and behavior is normal.        Procedures:none today        Assessment/Plan      Laurel was seen today for follow-up, chest pain, palpitations and dizziness.    Diagnoses and all orders for this visit:    Nonrheumatic aortic valve stenosis    S/P AVR    Essential hypertension    Mixed hyperlipidemia    Dizziness      The report of her echocardiogram done in August 2018 was reveiwed which showed normal LVEF, mild MVP and prosthetic aortic valve with GUANAKITO 1.3 cm2. Ms. Bianchi denies new or worsening symptoms. Dizziness remains improved after treatment per PT in the past. No repeat cardiac testing advised today.     Her blood pressure, heart rate and rhythm are normal. Continue same cardiac medications. No refills needed.     She " follow with you for medication refills and lab orders.     For management of lipids, she is taking Crestor without issues noted. Continue same and she will follow with you for recommendations regarding statin therapy.     Patient's Body mass index is 24.71 kg/m². BMI is within normal parameters. No follow-up required..    A 6 month follow up visit scheduled. Please call sooner for any cardiac concerns.             Electronically signed by SLIME Alvarez,  January 28, 2019 3:50 PM

## 2019-07-29 ENCOUNTER — OFFICE VISIT (OUTPATIENT)
Dept: CARDIOLOGY | Facility: CLINIC | Age: 84
End: 2019-07-29

## 2019-07-29 ENCOUNTER — CLINICAL SUPPORT (OUTPATIENT)
Dept: CARDIOLOGY | Facility: CLINIC | Age: 84
End: 2019-07-29

## 2019-07-29 DIAGNOSIS — I35.0 AORTIC VALVE STENOSIS, ETIOLOGY OF CARDIAC VALVE DISEASE UNSPECIFIED: ICD-10-CM

## 2019-07-29 DIAGNOSIS — R00.2 PALPITATIONS: ICD-10-CM

## 2019-07-29 DIAGNOSIS — Z95.2 S/P AVR: ICD-10-CM

## 2019-07-29 DIAGNOSIS — I10 ESSENTIAL HYPERTENSION: Primary | ICD-10-CM

## 2019-07-29 DIAGNOSIS — E78.00 HYPERCHOLESTEREMIA: ICD-10-CM

## 2019-07-29 PROBLEM — E78.5 HYPERLIPEMIA: Status: RESOLVED | Noted: 2017-07-27 | Resolved: 2019-07-29

## 2019-07-29 PROCEDURE — 0296T PR EXT ECG > 48HR TO 21 DAY RCRD W/CONECT INTL RCRD: CPT | Performed by: INTERNAL MEDICINE

## 2019-07-29 PROCEDURE — 99214 OFFICE O/P EST MOD 30 MIN: CPT | Performed by: INTERNAL MEDICINE

## 2019-07-29 NOTE — PROGRESS NOTES
Chief Complaint   Patient presents with   • Follow-up     for cardiac management   • Chest Pain     exertional chest pain, mid chest that radiates up into neck, choking sensation noted,    • Palpitations     randomly occurs once every 2-3 weeks   • Med Refill     PCP writes   • Labs     PCP checked 7/19/19, started K+       CARDIAC COMPLAINTS  palpitations        Subjective   Laurel Bianchi is a 84 y.o. female came in today for her follow-up visit.  She has history of aortic valvular stenosis diagnosed in 2008 who also had a dilated aortic root.  She underwent aortic root replacement along with aortic valve replacement using a pericardial valve.  Her last echocardiogram which was done about a year ago showed that the valve is still around 1.3 cm² which is about mild to moderate stenosis.  She came today stating that she has been noticing some chest pain radiating up into the neck the form of choking sensation.  It occurs mostly when she started noticing palpitation and then the symptoms occurs it occurs randomly may be every other week.  It occurs more when she exerted herself.  She apparently had labs done few weeks ago and was told that the potassium was low and supplement was added.  I do not have any other results and I am not sure whether the thyroid was checked.              Cardiac History  Past Surgical History:   Procedure Laterality Date   • CARDIAC CATHETERIZATION  08/26/2008     Cath- Normal Coronaries. GUANAKITO- 1 Cm2, Mod AI, Dil AO    • CARDIOVASCULAR STRESS TEST  11/15/2011     L. Myoview- Negative    • CARDIOVASCULAR STRESS TEST  06/05/2013    L. Myoview- negative for ischemia    • CONVERTED (HISTORICAL) HOLTER  02/28/2012     Holter- AVG HR HR 65 BPM.    • ECHO - CONVERTED  08/25/2008    Echo- ([facility]) EF 65%. GUANAKITO- 0.8 Cm2    • ECHO - CONVERTED  06/02/2009    Echo- EF >60%, GUANAKITO 1.7cm2.    • ECHO - CONVERTED  09/14/2010     Echo- EF >60%. GUANAKITO 1.5cm2,    • ECHO - CONVERTED  10/12/2011    Echo- EF 65%. GUANAKITO-  1.5 Cm2    • ECHO - CONVERTED  02/14/2013    Echo- EF 65%. GUANAKITO- 1.5 Cm2    • ECHO - CONVERTED  05/12/2015    Echo- EF 65%, mod AS, mild to mod MR, RVSP 28 mmHg    • ECHO - CONVERTED  11/17/2016    EF 60-65%, mild AS (GUANAKITO 1.73), trace MR   • ECHO - CONVERTED  08/02/2018    EF 55-60%. GUANAKITO- 1.3 Cm2. RVSP- 33 mmHg.   • OTHER SURGICAL HISTORY  08/29/2008    AVR with #21 Pericardial Valve & AO Root replacment with 28mm hemashield graft    • OTHER SURGICAL HISTORY  09/04/2008     R-thoracentesis    • OTHER SURGICAL HISTORY  05/31/2013    Carotid U/S-no significant hemodynamic stenosis, < 50%   • OTHER SURGICAL HISTORY  2013    Telerhythmics- Sinus rhythm with ave HR 77 bpm.        Current Outpatient Medications   Medication Sig Dispense Refill   • amLODIPine (NORVASC) 5 MG tablet Take 5 mg by mouth Daily.     • aspirin 81 MG EC tablet Take 81 mg by mouth Daily.     • calcitriol (ROCALTROL) 0.25 MCG capsule Takes 1 capsule daily     • Calcium Carb-Cholecalciferol (CALCIUM + D3) 600-200 MG-UNIT tablet Take  by mouth Daily.     • esomeprazole (nexIUM) 40 MG capsule Take 40 mg by mouth Every Morning Before Breakfast.     • hydrochlorothiazide (HYDRODIURIL) 25 MG tablet Take 25 mg by mouth Daily.     • lisinopril (PRINIVIL,ZESTRIL) 20 MG tablet Take 20 mg by mouth Daily.     • LORazepam (ATIVAN) 0.5 MG tablet Take 0.5 mg by mouth 2 (Two) Times a Day As Needed for anxiety.     • metoprolol tartrate (LOPRESSOR) 50 MG tablet Take 50 mg by mouth Daily.     • Omega-3 Fatty Acids (FISH OIL) 1200 MG capsule delayed-release Take  by mouth Daily.     • potassium chloride (K-DUR) 10 MEQ CR tablet Take 10 mEq by mouth Every Other Day.     • rosuvastatin (CRESTOR) 10 MG tablet Take  by mouth Daily.       No current facility-administered medications for this visit.        Allergies  :  Patient has no known allergies.       Past Medical History:   Diagnosis Date   • Abnormal PFT     3-19-09 PFT -- abnormal --- followed by Dr. Givens   •  Abnormal PFT     7-07-09: PFT-- Mild restrictive vent. defect. Insig. BD response.   • Anxiety and depression    • History of skin cancer     resection of the nose.   • Hypercholesterolemia    • Hypertension    • Migraines    • Partial Thyroidectomy    • Syncope     with severe aortic stenosis, S/P AVR       Social History     Socioeconomic History   • Marital status:      Spouse name: Not on file   • Number of children: Not on file   • Years of education: Not on file   • Highest education level: Not on file   Tobacco Use   • Smoking status: Never Smoker   • Smokeless tobacco: Never Used   Substance and Sexual Activity   • Alcohol use: No   • Drug use: No       Family History   Problem Relation Age of Onset   • Heart disease Mother    • Hypertension Mother    • Diabetes Mother    • Heart disease Father    • Other Father         lung problems   • Heart disease Other    • Hypertension Other    • Hyperlipidemia Other        Review of Systems   Constitution: Negative for decreased appetite and malaise/fatigue.   HENT: Negative for congestion and sore throat.    Eyes: Negative for blurred vision.   Cardiovascular: Positive for chest pain, irregular heartbeat and palpitations.   Respiratory: Negative for shortness of breath and snoring.    Endocrine: Negative for cold intolerance and heat intolerance.   Hematologic/Lymphatic: Negative for adenopathy. Does not bruise/bleed easily.   Skin: Negative for itching, nail changes and skin cancer.   Musculoskeletal: Negative for arthritis and myalgias.   Gastrointestinal: Negative for abdominal pain, dysphagia and heartburn.   Genitourinary: Negative for bladder incontinence and frequency.   Neurological: Negative for dizziness, light-headedness, seizures and vertigo.   Psychiatric/Behavioral: Negative for altered mental status.   Allergic/Immunologic: Negative for environmental allergies and hives.       Diabetes- No  Thyroid- normal    Objective     /58   Pulse 72   " Ht 162.6 cm (64\")   Wt 63.5 kg (140 lb)   BMI 24.03 kg/m²     Physical Exam   Constitutional: She is oriented to person, place, and time. She appears well-developed and well-nourished.   HENT:   Head: Normocephalic.   Nose: Nose normal.   Eyes: EOM are normal. Pupils are equal, round, and reactive to light.   Neck: Normal range of motion. Neck supple.   Cardiovascular: Normal rate, regular rhythm, S1 normal and S2 normal.   Murmur heard.  Pulmonary/Chest: Effort normal and breath sounds normal.   Abdominal: Soft. Bowel sounds are normal.   Musculoskeletal: Normal range of motion. She exhibits no edema.   Neurological: She is alert and oriented to person, place, and time.   Skin: Skin is warm and dry.   Psychiatric: She has a normal mood and affect.     Procedures            Assessment/Plan   Patient's Body mass index is 24.03 kg/m². BMI is within normal parameters. No follow-up required..     Laurel was seen today for follow-up, chest pain, palpitations, med refill and labs.    Diagnoses and all orders for this visit:    Essential hypertension    Hypercholesteremia    Aortic valve stenosis, etiology of cardiac valve disease unspecified    S/P AVR    Palpitations  -     Holter Monitor - 72 Hour Up To 21 Days; Future       At baseline her heart rate and blood pressure appears stable.  Her BMI is still within normal limits.  Her clinical examination reveals slightly loud second heart sound short systolic murmur at the mitral and aortic area.  She does not have any leg edema at this time.  We will continue the ACE inhibitors along with the beta-blockers and the calcium channel blockers for her hypertension.  Her hypercholesterolemia is being treated with Crestor so we will continue the same.  Her palpitation could be related to low calcium level.  She needs her magnesium level checked also along with a TSH.  I explained to her about the last echocardiogram showed the aortic stenosis is only mild to moderate not " significant enough to cause any discomfort.  I am going to place a 14-day Holter monitor on her to evaluate for the arrhythmia.  Based on the results of the palpitation she may or may not need 1C antiarrhythmic medication.  I will really appreciate if you can send me copy of her labs.                  Electronically signed by Darío Esposito MD July 30, 2019 3:33 PM

## 2019-07-30 VITALS
BODY MASS INDEX: 23.9 KG/M2 | SYSTOLIC BLOOD PRESSURE: 128 MMHG | WEIGHT: 140 LBS | HEIGHT: 64 IN | HEART RATE: 72 BPM | DIASTOLIC BLOOD PRESSURE: 58 MMHG

## 2019-07-30 RX ORDER — POTASSIUM CHLORIDE 750 MG/1
10 TABLET, FILM COATED, EXTENDED RELEASE ORAL EVERY OTHER DAY
COMMUNITY

## 2019-08-19 ENCOUNTER — TELEPHONE (OUTPATIENT)
Dept: CARDIOLOGY | Facility: CLINIC | Age: 84
End: 2019-08-19

## 2019-08-19 ENCOUNTER — OUTSIDE FACILITY SERVICE (OUTPATIENT)
Dept: CARDIOLOGY | Facility: CLINIC | Age: 84
End: 2019-08-19

## 2019-08-19 PROCEDURE — 0298T PR EXT ECG > 48HR TO 21 DAY REVIEW AND INTERPRETATN: CPT | Performed by: INTERNAL MEDICINE

## 2019-08-19 RX ORDER — METOPROLOL TARTRATE 50 MG/1
25 TABLET, FILM COATED ORAL 2 TIMES DAILY
Qty: 30 TABLET | Refills: 0
Start: 2019-08-19 | End: 2020-08-19

## 2020-02-19 ENCOUNTER — OFFICE VISIT (OUTPATIENT)
Dept: CARDIOLOGY | Facility: CLINIC | Age: 85
End: 2020-02-19

## 2020-02-19 VITALS
HEIGHT: 64 IN | WEIGHT: 139 LBS | DIASTOLIC BLOOD PRESSURE: 70 MMHG | BODY MASS INDEX: 23.73 KG/M2 | SYSTOLIC BLOOD PRESSURE: 136 MMHG | HEART RATE: 64 BPM

## 2020-02-19 DIAGNOSIS — Z95.2 S/P AVR: ICD-10-CM

## 2020-02-19 DIAGNOSIS — R07.89 OTHER CHEST PAIN: ICD-10-CM

## 2020-02-19 DIAGNOSIS — I35.0 AORTIC VALVE STENOSIS, ETIOLOGY OF CARDIAC VALVE DISEASE UNSPECIFIED: ICD-10-CM

## 2020-02-19 DIAGNOSIS — E78.00 HYPERCHOLESTEREMIA: ICD-10-CM

## 2020-02-19 DIAGNOSIS — I10 ESSENTIAL HYPERTENSION: ICD-10-CM

## 2020-02-19 DIAGNOSIS — R01.1 HEART MURMUR: ICD-10-CM

## 2020-02-19 DIAGNOSIS — R00.2 PALPITATIONS: ICD-10-CM

## 2020-02-19 DIAGNOSIS — R53.83 OTHER FATIGUE: ICD-10-CM

## 2020-02-19 PROCEDURE — 99214 OFFICE O/P EST MOD 30 MIN: CPT | Performed by: NURSE PRACTITIONER

## 2020-02-19 NOTE — PROGRESS NOTES
Chief Complaint   Patient presents with   • Follow-up     For cardiac management. Patient is on aspirin. Last lab work was done about 4 months ago per PCP, not in chart. States that she has had some chest pain on the left side, kind of makes her feel like she wants to choke. States that she occasionally has palpitations.    • Med Refill     PCP does medication refills. Brought medications with visit.        Subjective       Laurel Bianchi is a 85 y.o. female history of aortic valvular stenosis diagnosed in 2008 who also had a dilated aortic root.  She underwent aortic root replacement along with aortic valve replacement using a pericardial valve.  Her echocardiogram in 2018 showed that the valve is still around 1.3 cm², mild to moderate stenosis. In August 2019 cardiac monitor was placed due to palpitations. Short runs of SVT noted, mostly in evenings. Advised to change Lopressor 50 mg daily to 25 mg bid.     Today she comes to the office for a follow up visit. Palpitations, that occurred mostly in the evenings have improved since taking Lopressor twice a day. She admits to maninder mild episodes of chest discomfort. Shortness of breath on exertion is no worse. She denies swelling of her abdomen or lower legs. No dizziness or near syncope noted. No recent change in cardiac medications noted. She continues to live alone and maintains her ADL without issues but does have a house keeper to help with cleaning etc.     HPI     Cardiac History:    Past Surgical History:   Procedure Laterality Date   • CARDIAC CATHETERIZATION  08/26/2008     Cath- Normal Coronaries. GUANAKITO- 1 Cm2, Mod AI, Dil AO    • CARDIOVASCULAR STRESS TEST  11/15/2011     L. Myoview- Negative    • CARDIOVASCULAR STRESS TEST  06/05/2013    L. Myoview- negative for ischemia    • CONVERTED (HISTORICAL) HOLTER  02/28/2012     Holter- AVG HR HR 65 BPM.    • CONVERTED (HISTORICAL) HOLTER  08/19/2019    14 days. AVG-57. . 23 runs of SVT. Longest- 20 beats   •  ECHO - CONVERTED  08/25/2008    Echo- ([facility]) EF 65%. GUANAKITO- 0.8 Cm2    • ECHO - CONVERTED  06/02/2009    Echo- EF >60%, GUANAKITO 1.7cm2.    • ECHO - CONVERTED  09/14/2010     Echo- EF >60%. GUANAKITO 1.5cm2,    • ECHO - CONVERTED  10/12/2011    Echo- EF 65%. GUANAKITO- 1.5 Cm2    • ECHO - CONVERTED  02/14/2013    Echo- EF 65%. GUANAKITO- 1.5 Cm2    • ECHO - CONVERTED  05/12/2015    Echo- EF 65%, mod AS, mild to mod MR, RVSP 28 mmHg    • ECHO - CONVERTED  11/17/2016    EF 60-65%, mild AS (GUANAKITO 1.73), trace MR   • ECHO - CONVERTED  08/02/2018    EF 55-60%. GUANAKITO- 1.3 Cm2. RVSP- 33 mmHg.   • OTHER SURGICAL HISTORY  08/29/2008    AVR with #21 Pericardial Valve & AO Root replacment with 28mm hemashield graft    • OTHER SURGICAL HISTORY  09/04/2008     R-thoracentesis    • OTHER SURGICAL HISTORY  05/31/2013    Carotid U/S-no significant hemodynamic stenosis, < 50%   • OTHER SURGICAL HISTORY  2013    Telerhythmics- Sinus rhythm with ave HR 77 bpm.        Current Outpatient Medications   Medication Sig Dispense Refill   • amLODIPine (NORVASC) 5 MG tablet Take 5 mg by mouth Daily.     • aspirin 81 MG EC tablet Take 81 mg by mouth Daily.     • calcitriol (ROCALTROL) 0.25 MCG capsule Takes 2 capsule daily     • Calcium Carb-Cholecalciferol (CALCIUM + D3) 600-200 MG-UNIT tablet Take  by mouth Daily.     • esomeprazole (nexIUM) 40 MG capsule Take 40 mg by mouth Every Morning Before Breakfast.     • hydrochlorothiazide (HYDRODIURIL) 25 MG tablet Take 25 mg by mouth Daily.     • lisinopril (PRINIVIL,ZESTRIL) 20 MG tablet Take 20 mg by mouth Daily.     • LORazepam (ATIVAN) 0.5 MG tablet Take 0.5 mg by mouth 2 (Two) Times a Day As Needed for anxiety.     • metoprolol tartrate (LOPRESSOR) 50 MG tablet Take 0.5 tablets by mouth 2 (Two) Times a Day. 30 tablet 0   • Omega-3 Fatty Acids (FISH OIL) 1200 MG capsule delayed-release Take  by mouth Daily.     • potassium chloride (K-DUR) 10 MEQ CR tablet Take 10 mEq by mouth Every Other Day.     • rosuvastatin (CRESTOR)  10 MG tablet Take  by mouth Daily.       No current facility-administered medications for this visit.        Patient has no known allergies.    Past Medical History:   Diagnosis Date   • Abnormal PFT     3-19-09 PFT -- abnormal --- followed by Dr. Givens   • Abnormal PFT     7-07-09: PFT-- Mild restrictive vent. defect. Insig. BD response.   • Anxiety and depression    • History of skin cancer     resection of the nose.   • Hypercholesterolemia    • Hypertension    • Migraines    • Partial Thyroidectomy    • Syncope     with severe aortic stenosis, S/P AVR       Social History     Socioeconomic History   • Marital status:      Spouse name: Not on file   • Number of children: Not on file   • Years of education: Not on file   • Highest education level: Not on file   Tobacco Use   • Smoking status: Never Smoker   • Smokeless tobacco: Never Used   Substance and Sexual Activity   • Alcohol use: No   • Drug use: No       Family History   Problem Relation Age of Onset   • Heart disease Mother    • Hypertension Mother    • Diabetes Mother    • Heart disease Father    • Other Father         lung problems   • Heart disease Other    • Hypertension Other    • Hyperlipidemia Other        Review of Systems   Constitution: Positive for malaise/fatigue. Negative for decreased appetite.   HENT: Negative.    Eyes: Negative.    Cardiovascular: Positive for chest pain and palpitations (occassional fluttering but better). Negative for leg swelling and near-syncope.   Respiratory: Positive for shortness of breath. Negative for cough and sleep disturbances due to breathing.    Endocrine: Negative.    Hematologic/Lymphatic: Negative for bleeding problem. Does not bruise/bleed easily.   Skin: Negative.    Musculoskeletal: Positive for arthritis. Negative for muscle cramps and muscle weakness.   Gastrointestinal: Negative for bloating, abdominal pain, change in bowel habit, heartburn, melena and nausea.   Genitourinary: Negative  "for dysuria and hematuria.   Neurological: Positive for weakness (some days feel stronger than others). Negative for dizziness, loss of balance, numbness and paresthesias.   Psychiatric/Behavioral: Negative for altered mental status and memory loss. The patient has insomnia (at times) and is nervous/anxious.         Objective     /70 (BP Location: Left arm)   Pulse 64   Ht 162.6 cm (64.02\")   Wt 63 kg (139 lb)   BMI 23.85 kg/m²     Physical Exam   Constitutional: She is oriented to person, place, and time. She appears well-nourished.   HENT:   Head: Normocephalic.   Eyes: Pupils are equal, round, and reactive to light. Conjunctivae are normal.   Neck: Normal range of motion. Neck supple. No JVD present. Carotid bruit is not present.   Cardiovascular: Normal rate, regular rhythm, S1 normal and S2 normal.   Murmur heard.  Pulses:       Radial pulses are 2+ on the right side, and 2+ on the left side.   Pulmonary/Chest: Breath sounds normal. She has no wheezes. She has no rales.   Abdominal: Soft. Bowel sounds are normal. She exhibits no distension. There is no tenderness.   Musculoskeletal: Normal range of motion. She exhibits no edema.   Neurological: She is alert and oriented to person, place, and time.   Skin: Skin is warm and dry. No pallor.   Psychiatric: She has a normal mood and affect. Her behavior is normal.        Procedures: none today         Assessment/Plan      Laurel was seen today for follow-up and med refill.    Diagnoses and all orders for this visit:    Other chest pain  -     Adult Transthoracic Echo Complete W/ Cont if Necessary Per Protocol; Future    Heart murmur  -     Adult Transthoracic Echo Complete W/ Cont if Necessary Per Protocol; Future    Essential hypertension    Hypercholesteremia    Aortic valve stenosis, etiology of cardiac valve disease unspecified    S/P AVR  -     Adult Transthoracic Echo Complete W/ Cont if Necessary Per Protocol; Future    Other fatigue  -     Adult " Transthoracic Echo Complete W/ Cont if Necessary Per Protocol; Future    Palpitations      Ms. Bianchi admits to some recent episodes of chest pain. Some days she feels more fatigue and weakness. Her last echo in 2018 was stable. Given she has new symptoms, a repeat echo ordered to re-look at AVR and overall LV function.     Her blood pressure, heart rate and rhythm today are normal. The report of her cardiac monitor done last year reviewed. Her palpitations have improved. Advised to continue same dose Lopressor 25 mg bid, Lisinopril 20 mg daily and Norvasc 5 mg daily. No refills needed.     For cholesterol management she is on Crestor without issues noted. Please forward a copy of recent lab results?    Patient's Body mass index is 23.85 kg/m². BMI is within normal parameters. No follow-up required.  Continue heart healthy diet.     A 6-month follow-up visit scheduled.  Please call sooner for any cardiac concerns.           Electronically signed by SLIME Alvarez,  February 21, 2020 12:31 PM

## 2020-02-26 ENCOUNTER — HOSPITAL ENCOUNTER (OUTPATIENT)
Dept: CARDIOLOGY | Facility: HOSPITAL | Age: 85
Discharge: HOME | End: 2020-02-26
Admitting: NURSE PRACTITIONER

## 2020-02-26 DIAGNOSIS — R01.1 HEART MURMUR: ICD-10-CM

## 2020-02-26 DIAGNOSIS — R53.83 OTHER FATIGUE: ICD-10-CM

## 2020-02-26 DIAGNOSIS — Z95.2 S/P AVR: ICD-10-CM

## 2020-02-26 DIAGNOSIS — R07.89 OTHER CHEST PAIN: ICD-10-CM

## 2020-02-26 LAB
BH CV ECHO MEAS - ACS: 1.6 CM
BH CV ECHO MEAS - AO MAX PG (FULL): 5.7 MMHG
BH CV ECHO MEAS - AO MAX PG: 8 MMHG
BH CV ECHO MEAS - AO MEAN PG (FULL): 4 MMHG
BH CV ECHO MEAS - AO MEAN PG: 6 MMHG
BH CV ECHO MEAS - AO ROOT AREA (BSA CORRECTED): 1.9
BH CV ECHO MEAS - AO ROOT AREA: 8 CM^2
BH CV ECHO MEAS - AO ROOT DIAM: 3.2 CM
BH CV ECHO MEAS - AO V2 MAX: 141 CM/SEC
BH CV ECHO MEAS - AO V2 MEAN: 114 CM/SEC
BH CV ECHO MEAS - AO V2 VTI: 40.3 CM
BH CV ECHO MEAS - AVA(I,A): 1.4 CM^2
BH CV ECHO MEAS - AVA(I,D): 1.4 CM^2
BH CV ECHO MEAS - AVA(V,A): 1.4 CM^2
BH CV ECHO MEAS - AVA(V,D): 1.4 CM^2
BH CV ECHO MEAS - BSA(HAYCOCK): 1.7 M^2
BH CV ECHO MEAS - BSA: 1.7 M^2
BH CV ECHO MEAS - BZI_BMI: 23.9 KILOGRAMS/M^2
BH CV ECHO MEAS - BZI_METRIC_HEIGHT: 162.6 CM
BH CV ECHO MEAS - BZI_METRIC_WEIGHT: 63.1 KG
BH CV ECHO MEAS - EDV(CUBED): 96.1 ML
BH CV ECHO MEAS - EDV(MOD-SP4): 66.6 ML
BH CV ECHO MEAS - EDV(TEICH): 96.3 ML
BH CV ECHO MEAS - EF(CUBED): 89.5 %
BH CV ECHO MEAS - EF(MOD-SP4): 60.5 %
BH CV ECHO MEAS - EF(TEICH): 83.9 %
BH CV ECHO MEAS - ESV(CUBED): 10.1 ML
BH CV ECHO MEAS - ESV(MOD-SP4): 26.3 ML
BH CV ECHO MEAS - ESV(TEICH): 15.5 ML
BH CV ECHO MEAS - FS: 52.8 %
BH CV ECHO MEAS - IVS/LVPW: 1.1
BH CV ECHO MEAS - IVSD: 1.1 CM
BH CV ECHO MEAS - LA DIMENSION: 4.5 CM
BH CV ECHO MEAS - LA/AO: 1.4
BH CV ECHO MEAS - LV DIASTOLIC VOL/BSA (35-75): 39.7 ML/M^2
BH CV ECHO MEAS - LV IVRT: 0.09 SEC
BH CV ECHO MEAS - LV MASS(C)D: 178.8 GRAMS
BH CV ECHO MEAS - LV MASS(C)DI: 106.7 GRAMS/M^2
BH CV ECHO MEAS - LV MAX PG: 2.3 MMHG
BH CV ECHO MEAS - LV MEAN PG: 2 MMHG
BH CV ECHO MEAS - LV SYSTOLIC VOL/BSA (12-30): 15.7 ML/M^2
BH CV ECHO MEAS - LV V1 MAX: 75.1 CM/SEC
BH CV ECHO MEAS - LV V1 MEAN: 65.1 CM/SEC
BH CV ECHO MEAS - LV V1 VTI: 21.7 CM
BH CV ECHO MEAS - LVIDD: 4.6 CM
BH CV ECHO MEAS - LVIDS: 2.2 CM
BH CV ECHO MEAS - LVLD AP4: 6.9 CM
BH CV ECHO MEAS - LVLS AP4: 5.8 CM
BH CV ECHO MEAS - LVOT AREA (M): 2.5 CM^2
BH CV ECHO MEAS - LVOT AREA: 2.5 CM^2
BH CV ECHO MEAS - LVOT DIAM: 1.8 CM
BH CV ECHO MEAS - LVPWD: 1.1 CM
BH CV ECHO MEAS - MV A MAX VEL: 90.7 CM/SEC
BH CV ECHO MEAS - MV DEC SLOPE: 342.7 CM/SEC^2
BH CV ECHO MEAS - MV E MAX VEL: 90.2 CM/SEC
BH CV ECHO MEAS - MV E/A: 0.99
BH CV ECHO MEAS - MV MAX PG: 4.2 MMHG
BH CV ECHO MEAS - MV MEAN PG: 1 MMHG
BH CV ECHO MEAS - MV P1/2T MAX VEL: 103.5 CM/SEC
BH CV ECHO MEAS - MV P1/2T: 88.5 MSEC
BH CV ECHO MEAS - MV V2 MAX: 102 CM/SEC
BH CV ECHO MEAS - MV V2 MEAN: 54.6 CM/SEC
BH CV ECHO MEAS - MV V2 VTI: 44.5 CM
BH CV ECHO MEAS - MVA P1/2T LCG: 2.1 CM^2
BH CV ECHO MEAS - MVA(P1/2T): 2.5 CM^2
BH CV ECHO MEAS - MVA(VTI): 1.2 CM^2
BH CV ECHO MEAS - RAP SYSTOLE: 10 MMHG
BH CV ECHO MEAS - RVDD: 2.7 CM
BH CV ECHO MEAS - RVSP: 36.4 MMHG
BH CV ECHO MEAS - SI(AO): 193.4 ML/M^2
BH CV ECHO MEAS - SI(CUBED): 51.3 ML/M^2
BH CV ECHO MEAS - SI(LVOT): 33 ML/M^2
BH CV ECHO MEAS - SI(MOD-SP4): 24 ML/M^2
BH CV ECHO MEAS - SI(TEICH): 48.3 ML/M^2
BH CV ECHO MEAS - SV(AO): 324.1 ML
BH CV ECHO MEAS - SV(CUBED): 86 ML
BH CV ECHO MEAS - SV(LVOT): 55.2 ML
BH CV ECHO MEAS - SV(MOD-SP4): 40.3 ML
BH CV ECHO MEAS - SV(TEICH): 80.9 ML
BH CV ECHO MEAS - TR MAX VEL: 257 CM/SEC

## 2020-02-26 PROCEDURE — 93306 TTE W/DOPPLER COMPLETE: CPT

## 2020-02-26 PROCEDURE — 93306 TTE W/DOPPLER COMPLETE: CPT | Performed by: INTERNAL MEDICINE

## 2020-08-19 ENCOUNTER — OFFICE VISIT (OUTPATIENT)
Dept: CARDIOLOGY | Facility: CLINIC | Age: 85
End: 2020-08-19

## 2020-08-19 VITALS
DIASTOLIC BLOOD PRESSURE: 52 MMHG | TEMPERATURE: 98.5 F | WEIGHT: 136 LBS | HEIGHT: 64 IN | HEART RATE: 56 BPM | BODY MASS INDEX: 23.22 KG/M2 | SYSTOLIC BLOOD PRESSURE: 140 MMHG

## 2020-08-19 DIAGNOSIS — Z95.2 S/P AVR: ICD-10-CM

## 2020-08-19 DIAGNOSIS — E78.00 HYPERCHOLESTEREMIA: ICD-10-CM

## 2020-08-19 DIAGNOSIS — I51.89 DIASTOLIC DYSFUNCTION: ICD-10-CM

## 2020-08-19 DIAGNOSIS — R42 EPISODIC LIGHTHEADEDNESS: ICD-10-CM

## 2020-08-19 DIAGNOSIS — I35.0 AORTIC VALVE STENOSIS, ETIOLOGY OF CARDIAC VALVE DISEASE UNSPECIFIED: ICD-10-CM

## 2020-08-19 DIAGNOSIS — I10 ESSENTIAL HYPERTENSION: ICD-10-CM

## 2020-08-19 DIAGNOSIS — R00.2 PALPITATIONS: ICD-10-CM

## 2020-08-19 DIAGNOSIS — R00.1 BRADYCARDIA: ICD-10-CM

## 2020-08-19 PROCEDURE — 99214 OFFICE O/P EST MOD 30 MIN: CPT | Performed by: NURSE PRACTITIONER

## 2020-08-19 RX ORDER — PANTOPRAZOLE SODIUM 40 MG/1
40 TABLET, DELAYED RELEASE ORAL DAILY
COMMUNITY
End: 2021-04-15

## 2020-08-19 NOTE — PROGRESS NOTES
"Chief Complaint   Patient presents with   • Follow-up     for cardiac management   • Med Refill     PCP writes all refills   • Labs     PCP checked labs month   • Chest Pain     only if she over does herself, maybe once a week, doesn't feel like it's anything significant .       Subjective       Laurel Bianchi is a 85 y.o. female history of aortic valvular stenosis diagnosed in 2008 who also had a dilated aortic root.  She underwent aortic root replacement along with aortic valve replacement using a pericardial valve.  Her echocardiogram in 2018 showed that the valve is still around 1.3 cm², mild to moderate stenosis. In August 2019 cardiac monitor was placed due to palpitations. Short runs of SVT noted, mostly in evenings. Advised to change Lopressor 50 mg daily to 25 mg bid. Palpitations improved. Later dose decreased to 12.5 mg bid.  In February 2020 repeat echocardiogram showed LVEF of 56-60%.  Diastolic dysfunction noted.  Prosthetic aortic valve noted with stenosis being mild, GUANAKITO 1.4 cm².  Mild aortic regurg noted.  RVSP was 36 mmHg.    Today she comes to the office for a follow-up visit. Her main concern is lightheadedness and weakness. Her resting heart rate is often \"low\". She denies heart racing.        Cardiac History:    Past Surgical History:   Procedure Laterality Date   • CARDIAC CATHETERIZATION  08/26/2008     Cath- Normal Coronaries. GUANAKITO- 1 Cm2, Mod AI, Dil AO    • CARDIOVASCULAR STRESS TEST  11/15/2011     L. Myoview- Negative    • CARDIOVASCULAR STRESS TEST  06/05/2013    L. Myoview- negative for ischemia    • CONVERTED (HISTORICAL) HOLTER  02/28/2012     Holter- AVG HR HR 65 BPM.    • CONVERTED (HISTORICAL) HOLTER  08/19/2019    14 days. AVG-57. . 23 runs of SVT. Longest- 20 beats   • ECHO - CONVERTED  08/25/2008    Echo- ([facility]) EF 65%. GUANAKITO- 0.8 Cm2    • ECHO - CONVERTED  06/02/2009    Echo- EF >60%, GUANAKITO 1.7cm2.    • ECHO - CONVERTED  09/14/2010     Echo- EF >60%. GUANAKITO 1.5cm2,    • ECHO - " CONVERTED  10/12/2011    Echo- EF 65%. GUANAKITO- 1.5 Cm2    • ECHO - CONVERTED  02/14/2013    Echo- EF 65%. GUANAKITO- 1.5 Cm2    • ECHO - CONVERTED  05/12/2015    Echo- EF 65%, mod AS, mild to mod MR, RVSP 28 mmHg    • ECHO - CONVERTED  11/17/2016    EF 60-65%, mild AS (GUANAKITO 1.73), trace MR   • ECHO - CONVERTED  08/02/2018    EF 55-60%. GUANAKITO- 1.3 Cm2. RVSP- 33 mmHg.   • ECHO - CONVERTED  02/26/2020    EF 60%. LA- 4.5 Cm. AVR. GUANAKITO- 1.4 Cm2. Trace-Mild AI. Mild MR. RVSP- 36 mmHg.   • OTHER SURGICAL HISTORY  08/29/2008    AVR with #21 Pericardial Valve & AO Root replacment with 28mm hemashield graft    • OTHER SURGICAL HISTORY  09/04/2008     R-thoracentesis    • OTHER SURGICAL HISTORY  05/31/2013    Carotid U/S-no significant hemodynamic stenosis, < 50%   • OTHER SURGICAL HISTORY  2013    Telerhythmics- Sinus rhythm with ave HR 77 bpm.        Current Outpatient Medications   Medication Sig Dispense Refill   • amLODIPine (NORVASC) 5 MG tablet Take 5 mg by mouth Daily.     • aspirin 81 MG EC tablet Take 81 mg by mouth Daily.     • calcitriol (ROCALTROL) 0.25 MCG capsule Take 1 capsule daily     • Calcium Carb-Cholecalciferol (CALCIUM + D3) 600-200 MG-UNIT tablet Take  by mouth Daily.     • hydrochlorothiazide (HYDRODIURIL) 25 MG tablet Take 25 mg by mouth Daily.     • lisinopril (PRINIVIL,ZESTRIL) 20 MG tablet Take 20 mg by mouth Daily.     • LORazepam (ATIVAN) 0.5 MG tablet Take 0.5 mg by mouth 2 (Two) Times a Day As Needed for anxiety.     • Omega-3 Fatty Acids (FISH OIL) 1200 MG capsule delayed-release Take  by mouth Daily.     • pantoprazole (PROTONIX) 40 MG EC tablet Take 40 mg by mouth Daily.     • potassium chloride (K-DUR) 10 MEQ CR tablet Take 10 mEq by mouth Every Other Day.     • rosuvastatin (CRESTOR) 10 MG tablet Take  by mouth Daily.       No current facility-administered medications for this visit.        Patient has no known allergies.    Past Medical History:   Diagnosis Date   • Abnormal PFT     3-19-09 PFT --  abnormal --- followed by Dr. Givens   • Abnormal PFT     7-07-09: PFT-- Mild restrictive vent. defect. Insig. BD response.   • Anxiety and depression    • History of skin cancer     resection of the nose.   • Hypercholesterolemia    • Hypertension    • Migraines    • Partial Thyroidectomy    • Syncope     with severe aortic stenosis, S/P AVR       Social History     Socioeconomic History   • Marital status:      Spouse name: Not on file   • Number of children: Not on file   • Years of education: Not on file   • Highest education level: Not on file   Tobacco Use   • Smoking status: Never Smoker   • Smokeless tobacco: Never Used   Substance and Sexual Activity   • Alcohol use: No   • Drug use: No       Family History   Problem Relation Age of Onset   • Heart disease Mother    • Hypertension Mother    • Diabetes Mother    • Heart disease Father    • Other Father         lung problems   • Heart disease Other    • Hypertension Other    • Hyperlipidemia Other        Review of Systems   Constitution: Positive for decreased appetite and weight loss (3 pounds). Negative for diaphoresis and malaise/fatigue.   HENT: Negative for nosebleeds.    Eyes: Negative for blurred vision.   Cardiovascular: Negative for chest pain, claudication, cyanosis, dyspnea on exertion, irregular heartbeat, leg swelling, near-syncope, orthopnea, palpitations, paroxysmal nocturnal dyspnea and syncope.   Respiratory: Positive for shortness of breath (with exertion, same). Negative for snoring.    Endocrine: Positive for heat intolerance. Negative for cold intolerance.   Hematologic/Lymphatic: Negative for adenopathy. Does not bruise/bleed easily.   Skin: Negative for rash.   Musculoskeletal: Negative for falls and myalgias.   Gastrointestinal: Positive for dysphagia, nausea (when eat certain things) and vomiting. Negative for bloating, heartburn and melena.   Genitourinary: Negative for dysuria and hematuria.   Neurological: Positive for  "light-headedness, loss of balance (uses cane) and weakness. Negative for dizziness.   Psychiatric/Behavioral: The patient does not have insomnia and is not nervous/anxious.         Objective     /52   Pulse 56   Temp 98.5 °F (36.9 °C)   Ht 162.6 cm (64\")   Wt 61.7 kg (136 lb)   BMI 23.34 kg/m²     Physical Exam   Constitutional: She is oriented to person, place, and time. She appears well-nourished.   HENT:   Head: Normocephalic.   Eyes: Pupils are equal, round, and reactive to light.   Neck: Normal range of motion.   Cardiovascular: Normal rate, regular rhythm, S1 normal, S2 normal and normal pulses.   Murmur heard.   Systolic murmur is present radiating to the neck.  Pulmonary/Chest: Breath sounds normal.   Abdominal: Soft. Bowel sounds are normal.   Musculoskeletal: Normal range of motion.   Neurological: She is alert and oriented to person, place, and time.   Skin: Skin is warm.   Psychiatric: She has a normal mood and affect.        Procedures : none today       Problem List Items Addressed This Visit        Cardiovascular and Mediastinum    Essential hypertension    AS (aortic stenosis)    Palpitations    Hypercholesteremia       Other    S/P AVR      Other Visit Diagnoses     Diastolic dysfunction        Bradycardia        Episodic lightheadedness             Patient admits to weakness and lightheadedness. States, \"sometimes it feels like my heart isn't even beating\".  Heart rate today 56 bpm. No palpitations reported. Advised to stop metoprolol. Instructed to monitor vital signs and call for any issues.     The report of her recent echocardiogram reviewed which showed stable LV function and aortic valve. Clinically murmur appears stable. No repeat testing advised at this time.     For lipid management she is taking Crestor without side effects noted. Please forward a copy of most recent lab results?    Patient's Body mass index is 23.34 kg/m². BMI is within normal parameters. No follow-up " required. Admits to drinking Ensure most days, encouraged to continue.      A 6 month follow up visit scheduled. Please call sooner for cardiac concerns.            Electronically signed by SLIME Alvarez,  August 19, 2020 12:29

## 2020-08-24 ENCOUNTER — TELEPHONE (OUTPATIENT)
Dept: CARDIOLOGY | Facility: CLINIC | Age: 85
End: 2020-08-24

## 2020-08-24 NOTE — TELEPHONE ENCOUNTER
Patient called reporting that she had went back on Metoprolol 50 mg 1/2 tablet in AM and  1/2 tablet in PM , she adds that it has helped a lot with  racing heart .

## 2021-04-15 ENCOUNTER — OFFICE VISIT (OUTPATIENT)
Dept: CARDIOLOGY | Facility: CLINIC | Age: 86
End: 2021-04-15

## 2021-04-15 VITALS
SYSTOLIC BLOOD PRESSURE: 150 MMHG | BODY MASS INDEX: 22.61 KG/M2 | HEIGHT: 64 IN | HEART RATE: 70 BPM | WEIGHT: 132.4 LBS | DIASTOLIC BLOOD PRESSURE: 74 MMHG | TEMPERATURE: 98 F

## 2021-04-15 DIAGNOSIS — R00.2 PALPITATIONS: ICD-10-CM

## 2021-04-15 DIAGNOSIS — R01.1 CARDIAC MURMUR: ICD-10-CM

## 2021-04-15 DIAGNOSIS — I35.0 AORTIC VALVE STENOSIS, ETIOLOGY OF CARDIAC VALVE DISEASE UNSPECIFIED: Primary | ICD-10-CM

## 2021-04-15 DIAGNOSIS — Z95.2 S/P AVR: ICD-10-CM

## 2021-04-15 DIAGNOSIS — I10 ESSENTIAL HYPERTENSION: ICD-10-CM

## 2021-04-15 DIAGNOSIS — E78.00 HYPERCHOLESTEREMIA: ICD-10-CM

## 2021-04-15 DIAGNOSIS — R12 HEARTBURN: ICD-10-CM

## 2021-04-15 PROCEDURE — 99214 OFFICE O/P EST MOD 30 MIN: CPT | Performed by: NURSE PRACTITIONER

## 2021-04-15 RX ORDER — METOPROLOL TARTRATE 50 MG/1
50 TABLET, FILM COATED ORAL 2 TIMES DAILY
COMMUNITY
End: 2022-05-18 | Stop reason: DRUGHIGH

## 2021-04-15 RX ORDER — ESOMEPRAZOLE MAGNESIUM 40 MG/1
40 CAPSULE, DELAYED RELEASE ORAL
Qty: 30 CAPSULE | Refills: 6 | Status: SHIPPED | OUTPATIENT
Start: 2021-04-15 | End: 2021-10-19 | Stop reason: ALTCHOICE

## 2021-04-15 NOTE — PROGRESS NOTES
Chief Complaint   Patient presents with   • Follow-up     Cardiac managment   • LABS     Had labs inJanuary  per PCP    • Chest Pain     Reports sharp pain that radiates down to area below left breast.  Granddaughter reports she had 3-4 episodes of chest pain since last office visit. She had 1 episode which included vomiting. She feels Protonix does not work as well as Nexium.   • Dizziness     Reports dizziness and SOA at times.   • Med Refill     No refills needed . Had medication bottles today.       Subjective       Laurel Bianchi is a 86 y.o. female history of aortic valvular stenosis diagnosed in 2008 who also had a dilated aortic root.  She underwent aortic root replacement along with aortic valve replacement using a pericardial valve.  Her echocardiogram in 2018 showed that the valve is still around 1.3 cm², mild to moderate stenosis. In August 2019 cardiac monitor was placed due to palpitations. Short runs of SVT noted, mostly in evenings. Advised to change Lopressor 50 mg daily to 25 mg bid. Palpitations improved. Later dose decreased to 12.5 mg bid.  In February 2020 repeat echocardiogram showed LVEF of 56-60%.  Diastolic dysfunction noted.  Prosthetic aortic valve noted with stenosis being mild, GUANAKITO 1.4 cm².  Mild aortic regurg noted.  RVSP was 36 mmHg.    At last visit beta-blocker was completely discontinued due to bradycardia and lightheadedness. She then developed heart racing and resumed previous dose.     Today she returns to the office accompanied by her granddaughter.  About 2 months ago her sister passed away.  She admits to having a hard time since experiencing the loss.  At night she is unable to sleep.  She has had more episodes of heart palpitations and at times associated with lightheadedness.  She feels her blood pressure has been fluctuating but most often normal at home.  Her granddaughter states systolic is normally around 110.  Since taking Protonix she has had more heartburn symptoms  and has not been able to eat fruits and vegetables as she did when taking Nexium.      Cardiac History:    Past Surgical History:   Procedure Laterality Date   • CARDIAC CATHETERIZATION  08/26/2008     Cath- Normal Coronaries. GUANAKITO- 1 Cm2, Mod AI, Dil AO    • CARDIOVASCULAR STRESS TEST  11/15/2011     L. Myoview- Negative    • CARDIOVASCULAR STRESS TEST  06/05/2013    L. Myoview- negative for ischemia    • CONVERTED (HISTORICAL) HOLTER  02/28/2012     Holter- AVG HR HR 65 BPM.    • CONVERTED (HISTORICAL) HOLTER  08/19/2019    14 days. AVG-57. . 23 runs of SVT. Longest- 20 beats   • ECHO - CONVERTED  08/25/2008    Echo- ([facility]) EF 65%. GUANAKITO- 0.8 Cm2    • ECHO - CONVERTED  06/02/2009    Echo- EF >60%, GUANAKITO 1.7cm2.    • ECHO - CONVERTED  09/14/2010     Echo- EF >60%. GUANAKITO 1.5cm2,    • ECHO - CONVERTED  10/12/2011    Echo- EF 65%. GUANAKITO- 1.5 Cm2    • ECHO - CONVERTED  02/14/2013    Echo- EF 65%. GUANAKITO- 1.5 Cm2    • ECHO - CONVERTED  05/12/2015    Echo- EF 65%, mod AS, mild to mod MR, RVSP 28 mmHg    • ECHO - CONVERTED  11/17/2016    EF 60-65%, mild AS (GUANAKITO 1.73), trace MR   • ECHO - CONVERTED  08/02/2018    EF 55-60%. GUANAKITO- 1.3 Cm2. RVSP- 33 mmHg.   • ECHO - CONVERTED  02/26/2020    EF 60%. LA- 4.5 Cm. AVR. GUANAKITO- 1.4 Cm2. Trace-Mild AI. Mild MR. RVSP- 36 mmHg.   • OTHER SURGICAL HISTORY  08/29/2008    AVR with #21 Pericardial Valve & AO Root replacment with 28mm hemashield graft    • OTHER SURGICAL HISTORY  09/04/2008     R-thoracentesis    • OTHER SURGICAL HISTORY  05/31/2013    Carotid U/S-no significant hemodynamic stenosis, < 50%   • OTHER SURGICAL HISTORY  2013    Telerhythmics- Sinus rhythm with ave HR 77 bpm.        Current Outpatient Medications   Medication Sig Dispense Refill   • amLODIPine (NORVASC) 5 MG tablet Take 5 mg by mouth Daily.     • aspirin 81 MG EC tablet Take 81 mg by mouth Daily.     • calcitriol (ROCALTROL) 0.25 MCG capsule Take 1 capsule daily     • Calcium Carb-Cholecalciferol (CALCIUM + D3)  600-200 MG-UNIT tablet Take  by mouth Daily.     • hydrochlorothiazide (HYDRODIURIL) 25 MG tablet Take 25 mg by mouth Daily.     • lisinopril (PRINIVIL,ZESTRIL) 20 MG tablet Take 20 mg by mouth Daily.     • LORazepam (ATIVAN) 0.5 MG tablet Take 0.5 mg by mouth 2 (Two) Times a Day As Needed for anxiety.     • metoprolol tartrate (LOPRESSOR) 50 MG tablet Take 50 mg by mouth 2 (Two) Times a Day.     • Omega-3 Fatty Acids (FISH OIL) 1200 MG capsule delayed-release Take  by mouth Daily.     • potassium chloride (K-DUR) 10 MEQ CR tablet Take 10 mEq by mouth Every Other Day.     • rosuvastatin (CRESTOR) 10 MG tablet Take  by mouth Daily.     • esomeprazole (nexIUM) 40 MG capsule Take 1 capsule by mouth Every Morning Before Breakfast. 30 capsule 6     No current facility-administered medications for this visit.       Patient has no known allergies.    Past Medical History:   Diagnosis Date   • Abnormal PFT     3-19-09 PFT -- abnormal --- followed by Dr. Givens   • Abnormal PFT     7-07-09: PFT-- Mild restrictive vent. defect. Insig. BD response.   • Anxiety and depression    • History of skin cancer     resection of the nose.   • Hypercholesterolemia    • Hypertension    • Migraines    • Partial Thyroidectomy    • Syncope     with severe aortic stenosis, S/P AVR       Social History     Socioeconomic History   • Marital status:      Spouse name: Not on file   • Number of children: Not on file   • Years of education: Not on file   • Highest education level: Not on file   Tobacco Use   • Smoking status: Never Smoker   • Smokeless tobacco: Never Used   Vaping Use   • Vaping Use: Never used   Substance and Sexual Activity   • Alcohol use: No   • Drug use: No       Family History   Problem Relation Age of Onset   • Heart disease Mother    • Hypertension Mother    • Diabetes Mother    • Heart disease Father    • Other Father         lung problems   • Heart disease Other    • Hypertension Other    • Hyperlipidemia Other   "      Review of Systems   Constitutional: Negative for decreased appetite, diaphoresis and malaise/fatigue.   HENT: Negative for nosebleeds.    Eyes: Negative for blurred vision.   Cardiovascular: Positive for chest pain and palpitations. Negative for claudication, cyanosis, dyspnea on exertion, irregular heartbeat, leg swelling, near-syncope, orthopnea, paroxysmal nocturnal dyspnea and syncope.   Respiratory: Negative for shortness of breath and snoring.    Endocrine: Negative for cold intolerance and heat intolerance.   Hematologic/Lymphatic: Does not bruise/bleed easily.   Skin: Negative for rash.   Musculoskeletal: Negative for myalgias.   Gastrointestinal: Positive for bloating and heartburn. Negative for melena and nausea.   Genitourinary: Negative for dysuria and hematuria.   Neurological: Positive for light-headedness (with palpitations). Negative for dizziness.   Psychiatric/Behavioral: The patient has insomnia and is nervous/anxious.         BP Readings from Last 5 Encounters:   04/15/21 150/74   08/19/20 140/52   02/19/20 136/70   07/30/19 128/58   01/28/19 124/60       Wt Readings from Last 5 Encounters:   04/15/21 60.1 kg (132 lb 6.4 oz)   08/19/20 61.7 kg (136 lb)   02/19/20 63 kg (139 lb)   07/30/19 63.5 kg (140 lb)   01/28/19 65.3 kg (144 lb)       Objective     /74 (BP Location: Left arm)   Pulse 70   Temp 98 °F (36.7 °C)   Ht 162.6 cm (64\")   Wt 60.1 kg (132 lb 6.4 oz)   BMI 22.73 kg/m²     Vitals and nursing note reviewed.   Eyes:      Pupils: Pupils are equal, round, and reactive to light.   HENT:      Head: Normocephalic.   Neck:      Vascular: No JVD.   Pulmonary:      Breath sounds: Normal breath sounds.   Cardiovascular:      Normal rate. Regular rhythm.      Murmurs: There is a harsh systolic murmur at the URSB, radiating to the neck.   Pulses:     Intact distal pulses.   Edema:     Peripheral edema absent.   Abdominal:      General: Bowel sounds are normal.      Palpations: " Abdomen is soft.   Musculoskeletal: Normal range of motion.      Cervical back: Normal range of motion. Skin:     General: Skin is warm.   Neurological:      Mental Status: Alert and oriented to person, place, and time.          Procedures: cardiac monitor placed today          Assessment/Plan   Diagnoses and all orders for this visit:    1. Aortic valve stenosis, etiology of cardiac valve disease unspecified (Primary)  -     Adult Transthoracic Echo Complete W/ Cont if Necessary Per Protocol; Future    2. S/P AVR  -     Adult Transthoracic Echo Complete W/ Cont if Necessary Per Protocol; Future    3. Cardiac murmur    4. Essential hypertension  -     Adult Transthoracic Echo Complete W/ Cont if Necessary Per Protocol; Future    5. Hypercholesteremia    6. Palpitations  -     Adult Transthoracic Echo Complete W/ Cont if Necessary Per Protocol; Future  -     Holter Monitor - 72 Hour Up To 15 Days; Future    7. Heartburn  -     esomeprazole (nexIUM) 40 MG capsule; Take 1 capsule by mouth Every Morning Before Breakfast.  Dispense: 30 capsule; Refill: 6      Patient presents today with more symptoms of palpitations and lightheadedness.  Her heart rate and rhythm are normal today.  Blood pressure slightly increased.  At this time continue same dose Lopressor being 50 mg twice a day.  Continue lisinopril 20 mg daily.  Cardiac monitor placed to evaluate for arrhythmias and tachybradycardia syndrome.    Patient admits to significant grief.  To look for Takotsubo syndrome and reassess AVR, and echocardiogram ordered.    Patient's Body mass index is 22.73 kg/m². BMI is within normal parameters. No follow-up required..     Patient admits to more heartburn symptoms with current medication.  In the past she found Nexium beneficial.  I have reordered Nexium to see if insurance will approve.  She will continue Protonix until that time.    A 6-month follow-up visit scheduled with Dr. Esposito.  Further recommendations based on  cardiac monitor and echocardiogram results.  Please forward copy of next lab report.    Of note-patient also reports issues with insomnia.  Advised her to further discuss with you at follow-up visit next week.         Electronically signed by SLIME Alvarez,  April 15, 2021 14:29 EDT

## 2021-05-10 ENCOUNTER — HOSPITAL ENCOUNTER (OUTPATIENT)
Dept: CARDIOLOGY | Facility: HOSPITAL | Age: 86
Discharge: HOME | End: 2021-05-10
Admitting: NURSE PRACTITIONER

## 2021-05-10 DIAGNOSIS — Z95.2 S/P AVR: ICD-10-CM

## 2021-05-10 DIAGNOSIS — I10 ESSENTIAL HYPERTENSION: ICD-10-CM

## 2021-05-10 DIAGNOSIS — R00.2 PALPITATIONS: ICD-10-CM

## 2021-05-10 DIAGNOSIS — I35.0 AORTIC VALVE STENOSIS, ETIOLOGY OF CARDIAC VALVE DISEASE UNSPECIFIED: ICD-10-CM

## 2021-05-10 LAB
BH CV ECHO MEAS - ACS: 1.4 CM
BH CV ECHO MEAS - AO MAX PG (FULL): 8.4 MMHG
BH CV ECHO MEAS - AO MAX PG: 10.7 MMHG
BH CV ECHO MEAS - AO MEAN PG (FULL): 6 MMHG
BH CV ECHO MEAS - AO MEAN PG: 7 MMHG
BH CV ECHO MEAS - AO ROOT AREA (BSA CORRECTED): 1.7
BH CV ECHO MEAS - AO ROOT AREA: 6.4 CM^2
BH CV ECHO MEAS - AO ROOT DIAM: 2.9 CM
BH CV ECHO MEAS - AO V2 MAX: 163.3 CM/SEC
BH CV ECHO MEAS - AO V2 MEAN: 124 CM/SEC
BH CV ECHO MEAS - AO V2 VTI: 45 CM
BH CV ECHO MEAS - AVA(I,A): 1.4 CM^2
BH CV ECHO MEAS - AVA(I,D): 1.4 CM^2
BH CV ECHO MEAS - AVA(V,A): 1.3 CM^2
BH CV ECHO MEAS - AVA(V,D): 1.3 CM^2
BH CV ECHO MEAS - BSA(HAYCOCK): 1.6 M^2
BH CV ECHO MEAS - BSA: 1.6 M^2
BH CV ECHO MEAS - BZI_BMI: 22.7 KILOGRAMS/M^2
BH CV ECHO MEAS - BZI_METRIC_HEIGHT: 162.6 CM
BH CV ECHO MEAS - BZI_METRIC_WEIGHT: 59.9 KG
BH CV ECHO MEAS - EDV(CUBED): 85.8 ML
BH CV ECHO MEAS - EDV(MOD-SP4): 87.4 ML
BH CV ECHO MEAS - EDV(TEICH): 88.2 ML
BH CV ECHO MEAS - EF(CUBED): 83.9 %
BH CV ECHO MEAS - EF(MOD-SP4): 63.2 %
BH CV ECHO MEAS - EF(TEICH): 77.1 %
BH CV ECHO MEAS - ESV(CUBED): 13.8 ML
BH CV ECHO MEAS - ESV(MOD-SP4): 32.2 ML
BH CV ECHO MEAS - ESV(TEICH): 20.2 ML
BH CV ECHO MEAS - FS: 45.6 %
BH CV ECHO MEAS - IVS/LVPW: 0.96
BH CV ECHO MEAS - IVSD: 0.99 CM
BH CV ECHO MEAS - LA DIMENSION: 4.4 CM
BH CV ECHO MEAS - LA/AO: 1.5
BH CV ECHO MEAS - LV DIASTOLIC VOL/BSA (35-75): 53.3 ML/M^2
BH CV ECHO MEAS - LV IVRT: 0.11 SEC
BH CV ECHO MEAS - LV MASS(C)D: 150.4 GRAMS
BH CV ECHO MEAS - LV MASS(C)DI: 91.8 GRAMS/M^2
BH CV ECHO MEAS - LV MAX PG: 2.3 MMHG
BH CV ECHO MEAS - LV MEAN PG: 1 MMHG
BH CV ECHO MEAS - LV SYSTOLIC VOL/BSA (12-30): 19.6 ML/M^2
BH CV ECHO MEAS - LV V1 MAX: 75.5 CM/SEC
BH CV ECHO MEAS - LV V1 MEAN: 57.3 CM/SEC
BH CV ECHO MEAS - LV V1 VTI: 22.4 CM
BH CV ECHO MEAS - LVIDD: 4.4 CM
BH CV ECHO MEAS - LVIDS: 2.4 CM
BH CV ECHO MEAS - LVLD AP4: 6.9 CM
BH CV ECHO MEAS - LVLS AP4: 5.5 CM
BH CV ECHO MEAS - LVOT AREA (M): 2.8 CM^2
BH CV ECHO MEAS - LVOT AREA: 2.8 CM^2
BH CV ECHO MEAS - LVOT DIAM: 1.9 CM
BH CV ECHO MEAS - LVPWD: 1 CM
BH CV ECHO MEAS - MV A MAX VEL: 93.4 CM/SEC
BH CV ECHO MEAS - MV DEC SLOPE: 452 CM/SEC^2
BH CV ECHO MEAS - MV E MAX VEL: 85.3 CM/SEC
BH CV ECHO MEAS - MV E/A: 0.91
BH CV ECHO MEAS - RAP SYSTOLE: 10 MMHG
BH CV ECHO MEAS - RVDD: 2.7 CM
BH CV ECHO MEAS - RVSP: 33 MMHG
BH CV ECHO MEAS - SI(AO): 175.1 ML/M^2
BH CV ECHO MEAS - SI(CUBED): 43.9 ML/M^2
BH CV ECHO MEAS - SI(LVOT): 38.7 ML/M^2
BH CV ECHO MEAS - SI(MOD-SP4): 33.7 ML/M^2
BH CV ECHO MEAS - SI(TEICH): 41.5 ML/M^2
BH CV ECHO MEAS - SV(AO): 287.1 ML
BH CV ECHO MEAS - SV(CUBED): 71.9 ML
BH CV ECHO MEAS - SV(LVOT): 63.5 ML
BH CV ECHO MEAS - SV(MOD-SP4): 55.2 ML
BH CV ECHO MEAS - SV(TEICH): 68 ML
BH CV ECHO MEAS - TR MAX VEL: 240 CM/SEC
MAXIMAL PREDICTED HEART RATE: 134 BPM
STRESS TARGET HR: 114 BPM

## 2021-05-10 PROCEDURE — 93306 TTE W/DOPPLER COMPLETE: CPT | Performed by: INTERNAL MEDICINE

## 2021-05-10 PROCEDURE — 93306 TTE W/DOPPLER COMPLETE: CPT

## 2021-10-19 ENCOUNTER — OFFICE VISIT (OUTPATIENT)
Dept: CARDIOLOGY | Facility: CLINIC | Age: 86
End: 2021-10-19

## 2021-10-19 VITALS
HEIGHT: 64 IN | TEMPERATURE: 96.2 F | BODY MASS INDEX: 22.2 KG/M2 | DIASTOLIC BLOOD PRESSURE: 58 MMHG | HEART RATE: 51 BPM | SYSTOLIC BLOOD PRESSURE: 156 MMHG | WEIGHT: 130 LBS

## 2021-10-19 DIAGNOSIS — E78.00 HYPERCHOLESTEREMIA: ICD-10-CM

## 2021-10-19 DIAGNOSIS — I10 ESSENTIAL HYPERTENSION: ICD-10-CM

## 2021-10-19 DIAGNOSIS — I35.0 AORTIC VALVE STENOSIS, ETIOLOGY OF CARDIAC VALVE DISEASE UNSPECIFIED: Primary | ICD-10-CM

## 2021-10-19 DIAGNOSIS — R00.2 PALPITATIONS: ICD-10-CM

## 2021-10-19 DIAGNOSIS — I47.1 PSVT (PAROXYSMAL SUPRAVENTRICULAR TACHYCARDIA) (HCC): ICD-10-CM

## 2021-10-19 DIAGNOSIS — R00.1 BRADYCARDIA, SINUS: ICD-10-CM

## 2021-10-19 DIAGNOSIS — R42 VERTIGO: ICD-10-CM

## 2021-10-19 PROBLEM — I47.10 PSVT (PAROXYSMAL SUPRAVENTRICULAR TACHYCARDIA): Status: ACTIVE | Noted: 2021-10-19

## 2021-10-19 PROCEDURE — 99214 OFFICE O/P EST MOD 30 MIN: CPT | Performed by: INTERNAL MEDICINE

## 2021-10-19 PROCEDURE — 93000 ELECTROCARDIOGRAM COMPLETE: CPT | Performed by: INTERNAL MEDICINE

## 2021-10-19 RX ORDER — PANTOPRAZOLE SODIUM 40 MG/1
40 TABLET, DELAYED RELEASE ORAL DAILY
COMMUNITY

## 2021-10-19 RX ORDER — TRAZODONE HYDROCHLORIDE 50 MG/1
50 TABLET ORAL NIGHTLY
COMMUNITY

## 2021-10-19 RX ORDER — MECLIZINE HYDROCHLORIDE 25 MG/1
25 TABLET ORAL 3 TIMES DAILY PRN
Qty: 30 TABLET | Refills: 6 | Status: SHIPPED | OUTPATIENT
Start: 2021-10-19 | End: 2022-11-29

## 2021-10-19 NOTE — PROGRESS NOTES
Chief Complaint   Patient presents with   • Follow-up     for cardiac management   • Med Refill     PCP writes all her refills   • Labs     PCP checked about 3 months ago   • Dizziness     will feel like her balance is off at times, rarely will complain about real dizziness       CARDIAC COMPLAINTS  Dizziness and fatigue        Subjective   Laurel Bianchi is a 86 y.o. female came in today for her regular follow-up visit.  She has history of normal coronaries, aortic insufficiency, aortic stenosis for which she underwent aortic valve replacement along with the aortic root surgery.  She came today with occasional episodes of dizziness.  The dizziness she describes appears to be more of vertigo.  She feels like the room spinning her she herself spinning associated with nausea.  It is not associated with any palpitation.  It is not associated with dizziness.  Apparently she ran out of her Ativan and at that time she also got very agitated.  Her lab work was checked at your office a few months ago.              Cardiac History  Past Surgical History:   Procedure Laterality Date   • CARDIAC CATHETERIZATION  08/26/2008     Cath- Normal Coronaries. GUANAKITO- 1 Cm2, Mod AI, Dil AO    • CARDIOVASCULAR STRESS TEST  11/15/2011     L. Myoview- Negative    • CARDIOVASCULAR STRESS TEST  06/05/2013    L. Myoview- negative for ischemia    • CONVERTED (HISTORICAL) HOLTER  02/28/2012     Holter- AVG HR HR 65 BPM.    • CONVERTED (HISTORICAL) HOLTER  08/19/2019    14 days. AVG-57. . 23 runs of SVT. Longest- 20 beats   • CONVERTED (HISTORICAL) HOLTER  04/29/2021    3 Days. AVG 53. . 4 runs of SVT    • ECHO - CONVERTED  08/25/2008    Echo- ([facility]) EF 65%. GUANAKITO- 0.8 Cm2    • ECHO - CONVERTED  06/02/2009    Echo- EF >60%, GUANAKITO 1.7cm2.    • ECHO - CONVERTED  09/14/2010     Echo- EF >60%. GUANAKITO 1.5cm2,    • ECHO - CONVERTED  10/12/2011    Echo- EF 65%. GUANAKITO- 1.5 Cm2    • ECHO - CONVERTED  02/14/2013    Echo- EF 65%. GUANAKITO- 1.5 Cm2    • ECHO -  CONVERTED  05/12/2015    Echo- EF 65%, mod AS, mild to mod MR, RVSP 28 mmHg    • ECHO - CONVERTED  11/17/2016    EF 60-65%, mild AS (GUANAKITO 1.73), trace MR   • ECHO - CONVERTED  08/02/2018    EF 55-60%. UGANAKITO- 1.3 Cm2. RVSP- 33 mmHg.   • ECHO - CONVERTED  02/26/2020    EF 60%. LA- 4.5 Cm. AVR. GUANAKITO- 1.4 Cm2. Trace-Mild AI. Mild MR. RVSP- 36 mmHg.   • ECHO - CONVERTED  05/10/2021    EF 65%. LA- 4.4 Cm. AVR. GUANAKITO- 1.4 Cm2. Trace-Mild MR. RVSP- 33 mmHg   • OTHER SURGICAL HISTORY  08/29/2008    AVR with #21 Pericardial Valve & AO Root replacment with 28mm hemashield graft    • OTHER SURGICAL HISTORY  09/04/2008     R-thoracentesis    • OTHER SURGICAL HISTORY  05/31/2013    Carotid U/S-no significant hemodynamic stenosis, < 50%   • OTHER SURGICAL HISTORY  2013    Telerhythmics- Sinus rhythm with ave HR 77 bpm.        Current Outpatient Medications   Medication Sig Dispense Refill   • amLODIPine (NORVASC) 5 MG tablet Take 5 mg by mouth Daily.     • aspirin 81 MG EC tablet Take 81 mg by mouth Daily.     • calcitriol (ROCALTROL) 0.25 MCG capsule Take 1 capsule daily     • Calcium Carb-Cholecalciferol (CALCIUM + D3) 600-200 MG-UNIT tablet Take  by mouth Daily.     • hydrochlorothiazide (HYDRODIURIL) 25 MG tablet Take 25 mg by mouth Daily.     • lisinopril (PRINIVIL,ZESTRIL) 20 MG tablet Take 20 mg by mouth Daily.     • LORazepam (ATIVAN) 0.5 MG tablet Take 0.5 mg by mouth 2 (Two) Times a Day As Needed for anxiety.     • metoprolol tartrate (LOPRESSOR) 50 MG tablet Take 50 mg by mouth 2 (Two) Times a Day.     • Omega-3 Fatty Acids (FISH OIL) 1200 MG capsule delayed-release Take  by mouth Daily.     • pantoprazole (PROTONIX) 40 MG EC tablet Take 40 mg by mouth Daily.     • potassium chloride (K-DUR) 10 MEQ CR tablet Take 10 mEq by mouth Every Other Day.     • rosuvastatin (CRESTOR) 10 MG tablet Take  by mouth Daily.     • traZODone (DESYREL) 50 MG tablet 1/2 tab nightly     • meclizine (ANTIVERT) 25 MG tablet Take 1 tablet by mouth 3  (Three) Times a Day As Needed for Dizziness. 30 tablet 6     No current facility-administered medications for this visit.       Allergies  :  Patient has no known allergies.       Past Medical History:   Diagnosis Date   • Abnormal PFT     3-19-09 PFT -- abnormal --- followed by Dr. Givens   • Abnormal PFT     7-07-09: PFT-- Mild restrictive vent. defect. Insig. BD response.   • Anxiety and depression    • History of skin cancer     resection of the nose.   • Hypercholesterolemia    • Hypertension    • Migraines    • Partial Thyroidectomy    • Syncope     with severe aortic stenosis, S/P AVR       Social History     Socioeconomic History   • Marital status:    Tobacco Use   • Smoking status: Never Smoker   • Smokeless tobacco: Never Used   Vaping Use   • Vaping Use: Never used   Substance and Sexual Activity   • Alcohol use: No   • Drug use: No       Family History   Problem Relation Age of Onset   • Heart disease Mother    • Hypertension Mother    • Diabetes Mother    • Heart disease Father    • Other Father         lung problems   • Heart disease Other    • Hypertension Other    • Hyperlipidemia Other        Review of Systems   Constitutional: Positive for malaise/fatigue. Negative for decreased appetite.   HENT: Negative for congestion and sore throat.    Eyes: Negative for blurred vision.   Cardiovascular: Negative for chest pain and dyspnea on exertion.   Respiratory: Negative for shortness of breath and snoring.    Endocrine: Negative for cold intolerance and heat intolerance.   Hematologic/Lymphatic: Negative for adenopathy. Does not bruise/bleed easily.   Skin: Negative for itching, nail changes and skin cancer.   Musculoskeletal: Negative for arthritis and myalgias.   Gastrointestinal: Positive for nausea. Negative for abdominal pain, dysphagia and heartburn.   Genitourinary: Negative for bladder incontinence and frequency.   Neurological: Positive for vertigo. Negative for dizziness,  "light-headedness and seizures.   Psychiatric/Behavioral: Negative for altered mental status.   Allergic/Immunologic: Negative for environmental allergies and hives.       Diabetes- No  Thyroid- normal    Objective     /58   Pulse 51   Temp 96.2 °F (35.7 °C)   Ht 162.6 cm (64\")   Wt 59 kg (130 lb)   BMI 22.31 kg/m²     Vitals and nursing note reviewed.   Constitutional:       Appearance: Healthy appearance.   Eyes:      Conjunctiva/sclera: Conjunctivae normal.      Pupils: Pupils are equal, round, and reactive to light.   HENT:      Head: Normocephalic.   Pulmonary:      Effort: Pulmonary effort is normal.      Breath sounds: Normal breath sounds.   Cardiovascular:      PMI at left midclavicular line. Bradycardia present. Regular rhythm.      Murmurs: There is a systolic murmur.   Abdominal:      General: Bowel sounds are normal.      Palpations: Abdomen is soft.   Musculoskeletal: Normal range of motion.      Cervical back: Normal range of motion and neck supple. Skin:     General: Skin is warm and dry.   Neurological:      Mental Status: Alert, oriented to person, place, and time and oriented to person, place and time.         ECG 12 Lead    Date/Time: 10/19/2021 4:50 PM  Performed by: Darío Esposito MD  Authorized by: Darío Esposito MD   Comparison: compared with previous ECG from 6/2/2009  Similar to previous ECG  Rhythm: sinus bradycardia  Rate: bradycardic  Q waves: V3 and V4    QRS axis: right    Clinical impression: abnormal EKG                    Assessment/Plan   Patient's Body mass index is 22.31 kg/m². indicating that she is within normal range (BMI 18.5-24.9). No BMI management plan needed..     Diagnoses and all orders for this visit:    1. Aortic valve stenosis, etiology of cardiac valve disease unspecified (Primary)    2. Essential hypertension    3. Hypercholesteremia    4. Palpitations    5. Bradycardia, sinus    6. PSVT (paroxysmal supraventricular tachycardia) (HCC)    7. " Vertigo  -     meclizine (ANTIVERT) 25 MG tablet; Take 1 tablet by mouth 3 (Three) Times a Day As Needed for Dizziness.  Dispense: 30 tablet; Refill: 6    At baseline she is bradycardic.  Her blood pressure is slightly elevated.  Her EKG shows sinus bradycardia with rightward axis.  Cardiovascular examination is unremarkable.  BMI is 22.    Regarding the aortic valve stenosis, the last echocardiogram shows a still functioning adequately.  Continue the aspirin    Regarding her hypertension, it seems to be controlled with amlodipine and hydrochlorothiazide along with the lisinopril.  Continue the same need to have the electrolytes checked    Regarding her history of hypercholesterolemia in the past, she is on Crestor at 10 mg.  She needs her lipids checked along with the LFT    Regarding her history of palpitation and sinus bradycardia, the EKG did not show any major arrhythmia and the Holter monitor did not show any arrhythmia we will continue to monitor    Regarding her PSVT it was completely asymptomatic so we will continue to monitor    Regarding her vertigo, I gave her a prescription for meclizine to be used as needed.  If she continues to have this problem then she needs further work-up.    Regarding her advanced directive, she does not have a living will or power of .  I talked to her about it and gave her a booklet regarding.    I will see her back in 6 months or sooner if needed.                          Electronically signed by Darío Esposito MD October 19, 2021 16:46 EDT

## 2022-01-14 ENCOUNTER — PRIOR AUTHORIZATION (OUTPATIENT)
Dept: CARDIOLOGY | Facility: CLINIC | Age: 87
End: 2022-01-14

## 2022-05-18 ENCOUNTER — OFFICE VISIT (OUTPATIENT)
Dept: CARDIOLOGY | Facility: CLINIC | Age: 87
End: 2022-05-18

## 2022-05-18 VITALS
WEIGHT: 132 LBS | OXYGEN SATURATION: 95 % | DIASTOLIC BLOOD PRESSURE: 66 MMHG | HEIGHT: 64 IN | BODY MASS INDEX: 22.53 KG/M2 | HEART RATE: 43 BPM | SYSTOLIC BLOOD PRESSURE: 134 MMHG

## 2022-05-18 DIAGNOSIS — I47.1 PSVT (PAROXYSMAL SUPRAVENTRICULAR TACHYCARDIA): ICD-10-CM

## 2022-05-18 DIAGNOSIS — I10 ESSENTIAL HYPERTENSION: ICD-10-CM

## 2022-05-18 DIAGNOSIS — R00.1 BRADYCARDIA, SINUS: ICD-10-CM

## 2022-05-18 DIAGNOSIS — I35.0 AORTIC VALVE STENOSIS, ETIOLOGY OF CARDIAC VALVE DISEASE UNSPECIFIED: Primary | ICD-10-CM

## 2022-05-18 DIAGNOSIS — Z95.2 S/P AVR: ICD-10-CM

## 2022-05-18 DIAGNOSIS — R00.2 PALPITATIONS: ICD-10-CM

## 2022-05-18 PROCEDURE — 99214 OFFICE O/P EST MOD 30 MIN: CPT | Performed by: NURSE PRACTITIONER

## 2022-05-18 RX ORDER — METOPROLOL TARTRATE 37.5 MG/1
1 TABLET, FILM COATED ORAL DAILY
Qty: 90 TABLET | Refills: 3 | Status: SHIPPED | OUTPATIENT
Start: 2022-05-18 | End: 2022-11-29 | Stop reason: SDUPTHER

## 2022-05-18 NOTE — PROGRESS NOTES
Chief Complaint   Patient presents with   • Follow-up     Cardiac management-Patient denies chest pain and SOA. Patient does state she occasionally has palpitations and dizziness.   • Med Refill     Patient brought medications in with her today. Verified all current medications. No refills needed at this time.      Subjective       Laurel Bianchi is a 87 y.o. female with normal coronaries, aortic insufficiency/aortic stenosis for which she underwent AVR and aortic root replacement in August 2008.  She has been managed with aspirin, statin, antihypertensives and has done very well.  Echocardiogram in May 2021 showed normal LV function, GUANAKITO 1.4 cm².  Holter in April 2021 showed average heart rate 53, for short runs of SVT.  Metoprolol 50 mg was reduced to 25 mg.     She presents today for regular follow-up with her granddaughter.  She is doing well from a cardiac standpoint.  She has mild weakness and fatigue.  No presyncope or syncope.  Patient states palpitations increased when she reduced metoprolol to 25 mg as advised by Dr. Armenta.  Heart rate noted to be 43 today.         Cardiac History:    Past Surgical History:   Procedure Laterality Date   • CARDIAC CATHETERIZATION  08/26/2008     Cath- Normal Coronaries. GUANAKITO- 1 Cm2, Mod AI, Dil AO    • CARDIOVASCULAR STRESS TEST  11/15/2011     L. Myoview- Negative    • CARDIOVASCULAR STRESS TEST  06/05/2013    L. Myoview- negative for ischemia    • CONVERTED (HISTORICAL) HOLTER  02/28/2012     Holter- AVG HR HR 65 BPM.    • CONVERTED (HISTORICAL) HOLTER  08/19/2019    14 days. AVG-57. . 23 runs of SVT. Longest- 20 beats   • CONVERTED (HISTORICAL) HOLTER  04/29/2021    3 Days. AVG 53. . 4 runs of SVT    • ECHO - CONVERTED  08/25/2008    Echo- ([facility]) EF 65%. GUANAKITO- 0.8 Cm2    • ECHO - CONVERTED  06/02/2009    Echo- EF >60%, GUANAKITO 1.7cm2.    • ECHO - CONVERTED  09/14/2010     Echo- EF >60%. GUANAKITO 1.5cm2,    • ECHO - CONVERTED  10/12/2011    Echo- EF 65%. GUANAKITO- 1.5 Cm2    •  ECHO - CONVERTED  02/14/2013    Echo- EF 65%. GUANAKITO- 1.5 Cm2    • ECHO - CONVERTED  05/12/2015    Echo- EF 65%, mod AS, mild to mod MR, RVSP 28 mmHg    • ECHO - CONVERTED  11/17/2016    EF 60-65%, mild AS (GUANAKITO 1.73), trace MR   • ECHO - CONVERTED  08/02/2018    EF 55-60%. GUANAKITO- 1.3 Cm2. RVSP- 33 mmHg.   • ECHO - CONVERTED  02/26/2020    EF 60%. LA- 4.5 Cm. AVR. GUANAKITO- 1.4 Cm2. Trace-Mild AI. Mild MR. RVSP- 36 mmHg.   • ECHO - CONVERTED  05/10/2021    EF 65%. LA- 4.4 Cm. AVR. GUANAKITO- 1.4 Cm2. Trace-Mild MR. RVSP- 33 mmHg   • OTHER SURGICAL HISTORY  08/29/2008    AVR with #21 Pericardial Valve & AO Root replacment with 28mm hemashield graft    • OTHER SURGICAL HISTORY  09/04/2008     R-thoracentesis    • OTHER SURGICAL HISTORY  05/31/2013    Carotid U/S-no significant hemodynamic stenosis, < 50%   • OTHER SURGICAL HISTORY  2013    Telerhythmics- Sinus rhythm with ave HR 77 bpm.      Current Outpatient Medications   Medication Sig Dispense Refill   • amLODIPine (NORVASC) 5 MG tablet Take 5 mg by mouth Daily.     • aspirin 81 MG EC tablet Take 81 mg by mouth Daily.     • calcitriol (ROCALTROL) 0.25 MCG capsule Take 1 capsule daily     • Calcium Carb-Cholecalciferol (CALCIUM + D3) 600-200 MG-UNIT tablet Take  by mouth Daily.     • hydrochlorothiazide (HYDRODIURIL) 25 MG tablet Take 25 mg by mouth Daily.     • lisinopril (PRINIVIL,ZESTRIL) 20 MG tablet Take 20 mg by mouth Daily.     • LORazepam (ATIVAN) 0.5 MG tablet Take 0.5 mg by mouth 2 (Two) Times a Day As Needed for anxiety.     • meclizine (ANTIVERT) 25 MG tablet Take 1 tablet by mouth 3 (Three) Times a Day As Needed for Dizziness. 30 tablet 6   • Omega-3 Fatty Acids (FISH OIL) 1200 MG capsule delayed-release Take  by mouth Daily.     • pantoprazole (PROTONIX) 40 MG EC tablet Take 40 mg by mouth Daily.     • potassium chloride (K-DUR) 10 MEQ CR tablet Take 10 mEq by mouth Every Other Day.     • rosuvastatin (CRESTOR) 10 MG tablet Take  by mouth Daily.     • traZODone  (DESYREL) 50 MG tablet 1/2 tab nightly     • Metoprolol Tartrate 37.5 MG tablet Take 1 tablet by mouth Daily. 90 tablet 3     No current facility-administered medications for this visit.     Patient has no known allergies.    Past Medical History:   Diagnosis Date   • Abnormal PFT     3-19-09 PFT -- abnormal --- followed by Dr. Givens   • Abnormal PFT     7-07-09: PFT-- Mild restrictive vent. defect. Insig. BD response.   • Anxiety and depression    • History of skin cancer     resection of the nose.   • Hypercholesterolemia    • Hypertension    • Migraines    • Partial Thyroidectomy    • Syncope     with severe aortic stenosis, S/P AVR     Social History     Socioeconomic History   • Marital status:    Tobacco Use   • Smoking status: Never Smoker   • Smokeless tobacco: Never Used   Vaping Use   • Vaping Use: Never used   Substance and Sexual Activity   • Alcohol use: No   • Drug use: No     Family History   Problem Relation Age of Onset   • Heart disease Mother    • Hypertension Mother    • Diabetes Mother    • Heart disease Father    • Other Father         lung problems   • Heart disease Other    • Hypertension Other    • Hyperlipidemia Other      Review of Systems   Constitutional: Positive for malaise/fatigue and weight gain (+2 pounds). Negative for decreased appetite.   HENT: Negative.    Eyes: Negative for blurred vision.   Cardiovascular: Negative for chest pain, dyspnea on exertion, leg swelling, palpitations and syncope.   Respiratory: Negative for shortness of breath and sleep disturbances due to breathing.    Endocrine: Negative.    Hematologic/Lymphatic: Negative for bleeding problem. Does not bruise/bleed easily.   Skin: Negative.    Musculoskeletal: Positive for falls (1 fall after turning to reach for phone). Negative for myalgias.   Gastrointestinal: Negative for abdominal pain, heartburn and melena.   Genitourinary: Negative for hematuria.   Neurological: Negative for dizziness and  "light-headedness.   Psychiatric/Behavioral: Negative for altered mental status.   Allergic/Immunologic: Negative.       Objective     /66 (BP Location: Right arm, Patient Position: Sitting, Cuff Size: Adult)   Pulse (!) 43   Ht 162.6 cm (64\")   Wt 59.9 kg (132 lb)   SpO2 95%   BMI 22.66 kg/m²     Vitals and nursing note reviewed.   Constitutional:       General: Not in acute distress.     Appearance: Well-developed. Not diaphoretic.   Eyes:      Pupils: Pupils are equal, round, and reactive to light.   HENT:      Head: Normocephalic.   Pulmonary:      Effort: Pulmonary effort is normal. No respiratory distress.      Breath sounds: Normal breath sounds.   Cardiovascular:      Bradycardia present. Regular rhythm.      Murmurs: There is a grade 2 to 3/6 systolic murmur at the URSB.   Pulses:     Intact distal pulses.   Edema:     Peripheral edema absent.   Abdominal:      General: Bowel sounds are normal.      Palpations: Abdomen is soft.   Musculoskeletal: Normal range of motion.      Cervical back: Normal range of motion. Skin:     General: Skin is warm and dry.   Neurological:      Mental Status: Alert and oriented to person, place, and time.        Procedures          Problem List Items Addressed This Visit        Cardiac and Vasculature    Essential hypertension    Relevant Medications    Metoprolol Tartrate 37.5 MG tablet    AS (aortic stenosis) - Primary    Relevant Medications    Metoprolol Tartrate 37.5 MG tablet    S/P AVR    Palpitations    PSVT (paroxysmal supraventricular tachycardia) (HCC)    Relevant Medications    Metoprolol Tartrate 37.5 MG tablet    Bradycardia, sinus    Relevant Medications    Metoprolol Tartrate 37.5 MG tablet         1.  Aortic stenosis-s/p bioprosthetic aortic valve replacement in 2008.  Most recent echo in May 2021 showed stable valve with GUANAKITO 1.4 cm², normal LV function.  We will plan to repeat echo in 6 months to 1 year or sooner if she becomes symptomatic.    2.  " PSVT/palpitations/bradycardia- rhythm regular, bradycardic at 43 bpm.  She does not tolerate reducing metoprolol to 25 mg daily.  I explained to her beta-blocker induced bradycardia.  She is mostly asymptomatic with the low heart rate but agrees to reduce from 50 mg to 37.5 mg daily.  Her daughter-in-law will monitor heart rate and report if it does not increase to high 40s low 50s which is her baseline.  Labs including TSH followed by Dr. Armenta.    3.  HTN-well-controlled with lisinopril, amlodipine, HCTZ.  Continue same plan.    4.  Hyperlipidemia-managed with rosuvastatin, tolerates well.    Follow-up in 6 months or sooner if needed.    BMI has not been calculated during today's encounter.              Electronically signed by SLIME Johnson,  May 18, 2022 14:02 EDT

## 2022-11-29 ENCOUNTER — OFFICE VISIT (OUTPATIENT)
Dept: CARDIOLOGY | Facility: CLINIC | Age: 87
End: 2022-11-29

## 2022-11-29 VITALS
DIASTOLIC BLOOD PRESSURE: 70 MMHG | BODY MASS INDEX: 23.35 KG/M2 | HEIGHT: 64 IN | HEART RATE: 50 BPM | SYSTOLIC BLOOD PRESSURE: 160 MMHG | WEIGHT: 136.8 LBS

## 2022-11-29 DIAGNOSIS — I10 ESSENTIAL HYPERTENSION: ICD-10-CM

## 2022-11-29 DIAGNOSIS — R00.2 PALPITATIONS: ICD-10-CM

## 2022-11-29 DIAGNOSIS — E78.00 HYPERCHOLESTEREMIA: ICD-10-CM

## 2022-11-29 DIAGNOSIS — Z95.2 S/P AVR: ICD-10-CM

## 2022-11-29 DIAGNOSIS — R42 DIZZINESS: ICD-10-CM

## 2022-11-29 DIAGNOSIS — R00.1 BRADYCARDIA, SINUS: Primary | ICD-10-CM

## 2022-11-29 DIAGNOSIS — R01.1 CARDIAC MURMUR: ICD-10-CM

## 2022-11-29 PROCEDURE — 99214 OFFICE O/P EST MOD 30 MIN: CPT | Performed by: NURSE PRACTITIONER

## 2022-11-29 RX ORDER — ALLOPURINOL 100 MG/1
100 TABLET ORAL DAILY
COMMUNITY

## 2022-11-29 RX ORDER — DOXEPIN HYDROCHLORIDE 10 MG/1
10 CAPSULE ORAL NIGHTLY
COMMUNITY

## 2022-11-29 NOTE — PROGRESS NOTES
Chief Complaint   Patient presents with   • Follow-up     Pt is here for cardiac follow up.  Pt does admits to some dizziness.  She denies CP, SOB or palpitations.  Pt does take a daily ASA   • Med Refill     Pt request 90 day refills to be sent to [facility] in [place].  Medications were verified by med bottles     • Lab Work     Pt states their last labs were about 3 months ago with her PCP.         Cardiac Complaints  Dizziness      Subjective   Laurel Bianchi is a 87 y.o. female with normal coronaries, aortic insufficiency/aortic stenosis for which she underwent AVR and aortic root replacement in August 2008.  She has been managed with aspirin, statin, antihypertensives and has done very well.  Echocardiogram in May 2021 showed normal LV function, GUANAKITO 1.4 cm².  Holter in April 2021 showed average heart rate 53, for short runs of SVT.  Metoprolol was later decreased by PCP due to bradycardia, but she felt symptoms of palpitations worsened, and she began taking it at higher dosing.     She returns today for follow up and continues to have dizziness, which she states is worse some days than others and can at times affect her balance. She has been taking metoprolol at 37.5mg daily. Labs done with PCP, checked most recently 3 months ago, no current available. Refills requested for 90 day supply.      Cardiac History  Past Surgical History:   Procedure Laterality Date   • CARDIAC CATHETERIZATION  08/26/2008     Cath- Normal Coronaries. GUANAKITO- 1 Cm2, Mod AI, Dil AO    • CARDIOVASCULAR STRESS TEST  11/15/2011     L. Myoview- Negative    • CARDIOVASCULAR STRESS TEST  06/05/2013    L. Myoview- negative for ischemia    • CONVERTED (HISTORICAL) HOLTER  02/28/2012     Holter- AVG HR HR 65 BPM.    • CONVERTED (HISTORICAL) HOLTER  08/19/2019    14 days. AVG-57. . 23 runs of SVT. Longest- 20 beats   • CONVERTED (HISTORICAL) HOLTER  04/29/2021    3 Days. AVG 53. . 4 runs of SVT    • ECHO - CONVERTED  08/25/2008    Echo-  ([facility]) EF 65%. GUANAKITO- 0.8 Cm2    • ECHO - CONVERTED  06/02/2009    Echo- EF >60%, GUANAKITO 1.7cm2.    • ECHO - CONVERTED  09/14/2010     Echo- EF >60%. GUANAKITO 1.5cm2,    • ECHO - CONVERTED  10/12/2011    Echo- EF 65%. GUANAKITO- 1.5 Cm2    • ECHO - CONVERTED  02/14/2013    Echo- EF 65%. GUANAKITO- 1.5 Cm2    • ECHO - CONVERTED  05/12/2015    Echo- EF 65%, mod AS, mild to mod MR, RVSP 28 mmHg    • ECHO - CONVERTED  11/17/2016    EF 60-65%, mild AS (GUANAKITO 1.73), trace MR   • ECHO - CONVERTED  08/02/2018    EF 55-60%. GUANAKITO- 1.3 Cm2. RVSP- 33 mmHg.   • ECHO - CONVERTED  02/26/2020    EF 60%. LA- 4.5 Cm. AVR. GUANAKITO- 1.4 Cm2. Trace-Mild AI. Mild MR. RVSP- 36 mmHg.   • ECHO - CONVERTED  05/10/2021    EF 65%. LA- 4.4 Cm. AVR. GUANAKITO- 1.4 Cm2. Trace-Mild MR. RVSP- 33 mmHg   • OTHER SURGICAL HISTORY  08/29/2008    AVR with #21 Pericardial Valve & AO Root replacment with 28mm hemashield graft    • OTHER SURGICAL HISTORY  09/04/2008     R-thoracentesis    • OTHER SURGICAL HISTORY  05/31/2013    Carotid U/S-no significant hemodynamic stenosis, < 50%   • OTHER SURGICAL HISTORY  2013    Telerhythmics- Sinus rhythm with ave HR 77 bpm.        Current Outpatient Medications   Medication Sig Dispense Refill   • allopurinol (ZYLOPRIM) 100 MG tablet Take 100 mg by mouth Daily.     • amLODIPine (NORVASC) 5 MG tablet Take 5 mg by mouth Daily.     • aspirin 81 MG EC tablet Take 81 mg by mouth Daily.     • calcitriol (ROCALTROL) 0.25 MCG capsule Take 1 capsule daily     • Calcium Carb-Cholecalciferol (CALCIUM + D3) 600-200 MG-UNIT tablet Take  by mouth Daily.     • doxepin (SINEquan) 10 MG capsule Take 10 mg by mouth Every Night.     • hydrochlorothiazide (HYDRODIURIL) 25 MG tablet Take 25 mg by mouth Daily.     • lisinopril (PRINIVIL,ZESTRIL) 20 MG tablet Take 20 mg by mouth Daily.     • LORazepam (ATIVAN) 0.5 MG tablet Take 0.5 mg by mouth 2 (Two) Times a Day As Needed for anxiety.     • Omega-3 Fatty Acids (FISH OIL) 1200 MG capsule delayed-release Take  by mouth  Daily.     • pantoprazole (PROTONIX) 40 MG EC tablet Take 40 mg by mouth Daily.     • potassium chloride (K-DUR) 10 MEQ CR tablet Take 10 mEq by mouth Every Other Day.     • rosuvastatin (CRESTOR) 10 MG tablet Take  by mouth Daily.     • traZODone (DESYREL) 50 MG tablet Take 50 mg by mouth Every Night. 1/2 tab nightly     • metoprolol tartrate (LOPRESSOR) 25 MG tablet Take 1 tablet by mouth Daily. 90 tablet 3     No current facility-administered medications for this visit.       Patient has no known allergies.    Past Medical History:   Diagnosis Date   • Abnormal PFT     3-19-09 PFT -- abnormal --- followed by Dr. Givens   • Abnormal PFT     7-07-09: PFT-- Mild restrictive vent. defect. Insig. BD response.   • Anxiety and depression    • Gout    • History of skin cancer     resection of the nose.   • Hypercholesterolemia    • Hypertension    • Migraines    • Partial Thyroidectomy    • Syncope     with severe aortic stenosis, S/P AVR       Social History     Socioeconomic History   • Marital status:    Tobacco Use   • Smoking status: Never   • Smokeless tobacco: Never   Vaping Use   • Vaping Use: Never used   Substance and Sexual Activity   • Alcohol use: No   • Drug use: No       Family History   Problem Relation Age of Onset   • Heart disease Mother    • Hypertension Mother    • Diabetes Mother    • Heart disease Father    • Other Father         lung problems   • Heart disease Other    • Hypertension Other    • Hyperlipidemia Other        Review of Systems   Constitutional: Negative for malaise/fatigue and night sweats.   Cardiovascular: Negative for chest pain, claudication, dyspnea on exertion, irregular heartbeat, leg swelling, near-syncope, orthopnea, palpitations and syncope.   Respiratory: Negative for cough, shortness of breath and wheezing.    Musculoskeletal: Positive for stiffness. Negative for back pain and joint pain.   Gastrointestinal: Negative for anorexia, heartburn, nausea and vomiting.  "  Genitourinary: Negative for dysuria, hematuria, hesitancy and nocturia.   Neurological: Positive for dizziness and light-headedness.   Psychiatric/Behavioral: Negative for depression and memory loss. The patient is not nervous/anxious.            Objective     /70 (BP Location: Left arm, Patient Position: Sitting)   Pulse 50   Ht 162.6 cm (64\")   Wt 62.1 kg (136 lb 12.8 oz)   BMI 23.48 kg/m²     Constitutional:       Appearance: Not in distress.   Eyes:      Pupils: Pupils are equal, round, and reactive to light.   HENT:      Nose: Nose normal.   Pulmonary:      Effort: Pulmonary effort is normal.      Breath sounds: Normal breath sounds.   Cardiovascular:      PMI at left midclavicular line. Bradycardia present. Regular rhythm.      Murmurs: There is a systolic murmur.   Abdominal:      Palpations: Abdomen is soft.   Musculoskeletal: Normal range of motion.      Cervical back: Normal range of motion and neck supple. Skin:     General: Skin is warm and dry.   Neurological:      Mental Status: Alert.         Procedures         Diagnoses and all orders for this visit:    1. Bradycardia, sinus (Primary)    2. Palpitations    3. S/P AVR    4. Essential hypertension    5. Hypercholesteremia    6. Cardiac murmur    7. Dizziness    Other orders  -     metoprolol tartrate (LOPRESSOR) 25 MG tablet; Take 1 tablet by mouth Daily.  Dispense: 90 tablet; Refill: 3             Bradycardia: Most likely attributing to her dizzy spells. Grand daughter who accompanied her advised to decrease metoprolol to 25mg per day. If pulse should remain low, may decrease to 12.5mg daily. If dizziness should persist and HR should improve, carotid US to be considered.     HTN: BP is elevated SBP today, but her grand daughter reports at home it has been 130-140 SBP range. Limited sodium urged. For now, no adjustment to current norvasc, lisinopril, HCTZ advised. If elevation noted at home, they will call for further recommendations. "     Hyperlipidemia: On statin therapy with crestor. She has tolerated well. FLP has been done with your office, no current available for review. Limited carbs urged.     Cardiac status: Appears stable. No new concerns voiced. ASA will be continued, no new workup urged.     Refills per request.     BMI noted at 23.48, good cardiac diet urged.     6 month follow up advised, or sooner if needed.       Problems Addressed this Visit        Cardiac and Vasculature    Essential hypertension    Relevant Medications    metoprolol tartrate (LOPRESSOR) 25 MG tablet    S/P AVR    Palpitations    Hypercholesteremia    Bradycardia, sinus - Primary    Relevant Medications    metoprolol tartrate (LOPRESSOR) 25 MG tablet   Other Visit Diagnoses     Cardiac murmur        Dizziness          Diagnoses       Codes Comments    Bradycardia, sinus    -  Primary ICD-10-CM: R00.1  ICD-9-CM: 427.89     Palpitations     ICD-10-CM: R00.2  ICD-9-CM: 785.1     S/P AVR     ICD-10-CM: Z95.2  ICD-9-CM: V43.3     Essential hypertension     ICD-10-CM: I10  ICD-9-CM: 401.9     Hypercholesteremia     ICD-10-CM: E78.00  ICD-9-CM: 272.0     Cardiac murmur     ICD-10-CM: R01.1  ICD-9-CM: 785.2     Dizziness     ICD-10-CM: R42  ICD-9-CM: 780.4           BMI is within normal parameters. No other follow-up for BMI required.                Electronically signed by SLIME Lei November 29, 2022 14:36 EST

## 2023-05-31 ENCOUNTER — OFFICE VISIT (OUTPATIENT)
Dept: CARDIOLOGY | Facility: CLINIC | Age: 88
End: 2023-05-31

## 2023-05-31 VITALS
HEIGHT: 64 IN | BODY MASS INDEX: 23.08 KG/M2 | SYSTOLIC BLOOD PRESSURE: 144 MMHG | HEART RATE: 50 BPM | DIASTOLIC BLOOD PRESSURE: 70 MMHG | WEIGHT: 135.2 LBS

## 2023-05-31 DIAGNOSIS — I35.0 AORTIC STENOSIS, MODERATE: Primary | ICD-10-CM

## 2023-05-31 DIAGNOSIS — Z95.2 S/P AVR: ICD-10-CM

## 2023-05-31 DIAGNOSIS — R00.1 BRADYCARDIA, SINUS: ICD-10-CM

## 2023-05-31 DIAGNOSIS — R00.2 PALPITATIONS: ICD-10-CM

## 2023-05-31 DIAGNOSIS — R07.89 CHEST DISCOMFORT: ICD-10-CM

## 2023-05-31 DIAGNOSIS — E78.00 HYPERCHOLESTEREMIA: ICD-10-CM

## 2023-05-31 DIAGNOSIS — I10 ESSENTIAL HYPERTENSION: ICD-10-CM

## 2023-05-31 PROCEDURE — 99214 OFFICE O/P EST MOD 30 MIN: CPT | Performed by: NURSE PRACTITIONER

## 2023-05-31 RX ORDER — MAGNESIUM OXIDE 400 MG/1
1 TABLET ORAL DAILY
COMMUNITY
Start: 2023-05-03

## 2023-05-31 NOTE — PROGRESS NOTES
Chief Complaint   Patient presents with   • Follow-up     Pt is here for cardiac follow up.  Pt reports that she has CP and palpitations.  Her granddaughter states this usually occurs when she is anxious.  She denies dizziness or SOB.  Pt does take a daily ASA.     • Med Refill     Pt request 90 day refills to be sent to [facility].  Medications were verified by med bottles.     • Lab Work     Pt states their last labs were in December with her PCP.         Cardiac Complaints  chest pressure/discomfort and palpitations      Subjective   Laurel Bianchi is a 88 y.o. female with normal coronaries, aortic insufficiency/aortic stenosis for which she underwent AVR and aortic root replacement in August 2008.  She has been managed with aspirin, statin, antihypertensives and has done very well.  Echocardiogram in May 2021 showed normal LV function, GUANAKITO 1.4 cm².  Holter in April 2021 showed average heart rate 53, for short runs of SVT.  Metoprolol was later decreased by PCP due to bradycardia, but she felt symptoms of palpitations worsened, and she began taking it at higher dosing.     She returns today for follow up and continues to have palpitations and sharp chest pain, mostly if she is anxious. She denies any dizziness, SOA, or syncope. Labs with PCP, checked in December. Will follow again with PCP soon for labs. Refills requested.            Cardiac History  Past Surgical History:   Procedure Laterality Date   • CARDIAC CATHETERIZATION  08/26/2008     Cath- Normal Coronaries. GUANAKITO- 1 Cm2, Mod AI, Dil AO    • CARDIOVASCULAR STRESS TEST  11/15/2011     L. Myoview- Negative    • CARDIOVASCULAR STRESS TEST  06/05/2013    L. Myoview- negative for ischemia    • CONVERTED (HISTORICAL) HOLTER  02/28/2012     Holter- AVG HR HR 65 BPM.    • CONVERTED (HISTORICAL) HOLTER  08/19/2019    14 days. AVG-57. . 23 runs of SVT. Longest- 20 beats   • CONVERTED (HISTORICAL) HOLTER  04/29/2021    3 Days. AVG 53. . 4 runs of SVT    • ECHO -  CONVERTED  08/25/2008    Echo- ([facility]) EF 65%. GUANAKITO- 0.8 Cm2    • ECHO - CONVERTED  06/02/2009    Echo- EF >60%, GUANAKITO 1.7cm2.    • ECHO - CONVERTED  09/14/2010     Echo- EF >60%. GUANAKITO 1.5cm2,    • ECHO - CONVERTED  10/12/2011    Echo- EF 65%. GUANAKITO- 1.5 Cm2    • ECHO - CONVERTED  02/14/2013    Echo- EF 65%. GUANAKITO- 1.5 Cm2    • ECHO - CONVERTED  05/12/2015    Echo- EF 65%, mod AS, mild to mod MR, RVSP 28 mmHg    • ECHO - CONVERTED  11/17/2016    EF 60-65%, mild AS (GUANAKITO 1.73), trace MR   • ECHO - CONVERTED  08/02/2018    EF 55-60%. GUANAKITO- 1.3 Cm2. RVSP- 33 mmHg.   • ECHO - CONVERTED  02/26/2020    EF 60%. LA- 4.5 Cm. AVR. GUANAKITO- 1.4 Cm2. Trace-Mild AI. Mild MR. RVSP- 36 mmHg.   • ECHO - CONVERTED  05/10/2021    EF 65%. LA- 4.4 Cm. AVR. GUANAKITO- 1.4 Cm2. Trace-Mild MR. RVSP- 33 mmHg   • OTHER SURGICAL HISTORY  08/29/2008    AVR with #21 Pericardial Valve & AO Root replacment with 28mm hemashield graft    • OTHER SURGICAL HISTORY  09/04/2008     R-thoracentesis    • OTHER SURGICAL HISTORY  05/31/2013    Carotid U/S-no significant hemodynamic stenosis, < 50%   • OTHER SURGICAL HISTORY  2013    Telerhythmics- Sinus rhythm with ave HR 77 bpm.        Current Outpatient Medications   Medication Sig Dispense Refill   • allopurinol (ZYLOPRIM) 100 MG tablet Take 1 tablet by mouth Daily.     • amLODIPine (NORVASC) 5 MG tablet Take 1 tablet by mouth Daily.     • aspirin 81 MG EC tablet Take 1 tablet by mouth Daily.     • calcitriol (ROCALTROL) 0.25 MCG capsule Take 1 capsule daily     • Calcium Carb-Cholecalciferol (CALCIUM + D3) 600-200 MG-UNIT tablet Take  by mouth Daily.     • doxepin (SINEquan) 10 MG capsule Take 1 capsule by mouth Every Night.     • hydrochlorothiazide (HYDRODIURIL) 25 MG tablet Take 1 tablet by mouth Daily.     • lisinopril (PRINIVIL,ZESTRIL) 20 MG tablet Take 1 tablet by mouth Daily.     • LORazepam (ATIVAN) 0.5 MG tablet Take 1 tablet by mouth 2 (Two) Times a Day As Needed for Anxiety.     • magnesium oxide (MAG-OX) 400  MG tablet Take 1 tablet by mouth Daily.     • Omega-3 Fatty Acids (FISH OIL) 1200 MG capsule delayed-release Take  by mouth Daily.     • pantoprazole (PROTONIX) 40 MG EC tablet Take 1 tablet by mouth Daily.     • potassium chloride (K-DUR) 10 MEQ CR tablet Take 1 tablet by mouth Every Other Day.     • rosuvastatin (CRESTOR) 10 MG tablet Take  by mouth Daily.     • traZODone (DESYREL) 50 MG tablet Take 1 tablet by mouth Every Night. 1/2 tab nightly     • metoprolol tartrate (LOPRESSOR) 25 MG tablet 1/2 tablet AM, may use extra 1/2 tablet in PM if needed for palpitations. 90 tablet 3     No current facility-administered medications for this visit.       Patient has no known allergies.    Past Medical History:   Diagnosis Date   • Abnormal PFT     3-19-09 PFT -- abnormal --- followed by Dr. Givens   • Abnormal PFT     7-07-09: PFT-- Mild restrictive vent. defect. Insig. BD response.   • Anxiety and depression    • Gout    • History of skin cancer     resection of the nose.   • Hypercholesterolemia    • Hypertension    • Migraines    • Partial Thyroidectomy    • Syncope     with severe aortic stenosis, S/P AVR       Social History     Socioeconomic History   • Marital status:    Tobacco Use   • Smoking status: Never   • Smokeless tobacco: Never   Vaping Use   • Vaping Use: Never used   Substance and Sexual Activity   • Alcohol use: No   • Drug use: No       Family History   Problem Relation Age of Onset   • Heart disease Mother    • Hypertension Mother    • Diabetes Mother    • Heart disease Father    • Other Father         lung problems   • Heart disease Other    • Hypertension Other    • Hyperlipidemia Other        Review of Systems   Constitutional: Negative for malaise/fatigue and night sweats.   Cardiovascular: Positive for chest pain and palpitations. Negative for claudication, dyspnea on exertion, irregular heartbeat, leg swelling, near-syncope, orthopnea and syncope.   Respiratory: Negative for cough,  "shortness of breath and wheezing.    Musculoskeletal: Positive for stiffness. Negative for back pain and joint pain.   Gastrointestinal: Negative for anorexia, heartburn, nausea and vomiting.   Genitourinary: Negative for dysuria, hematuria and hesitancy.   Neurological: Negative for dizziness, headaches and light-headedness.   Psychiatric/Behavioral: Negative for depression and memory loss. The patient is not nervous/anxious.            Objective     /70 (BP Location: Left arm, Patient Position: Sitting)   Pulse 50   Ht 162.6 cm (64\")   Wt 61.3 kg (135 lb 3.2 oz)   BMI 23.21 kg/m²     Constitutional:       Appearance: Frail.   Eyes:      Pupils: Pupils are equal, round, and reactive to light.   HENT:      Nose: Nose normal.   Pulmonary:      Effort: Pulmonary effort is normal.      Breath sounds: Normal breath sounds.   Cardiovascular:      PMI at left midclavicular line. Bradycardia present. Regular rhythm.      Murmurs: There is a systolic murmur.   Abdominal:      Palpations: Abdomen is soft.   Musculoskeletal: Normal range of motion.      Cervical back: Normal range of motion and neck supple. Skin:     General: Skin is warm and dry.   Neurological:      Mental Status: Alert.         Procedures         Diagnoses and all orders for this visit:    1. Aortic stenosis, moderate (Primary)  -     Adult Transthoracic Echo Complete W/ Cont if Necessary Per Protocol; Future    2. Chest discomfort  -     Adult Transthoracic Echo Complete W/ Cont if Necessary Per Protocol; Future    3. Bradycardia, sinus    4. Palpitations    5. Essential hypertension    6. Hypercholesteremia    7. S/P AVR    Other orders  -     metoprolol tartrate (LOPRESSOR) 25 MG tablet; 1/2 tablet AM, may use extra 1/2 tablet in PM if needed for palpitations.  Dispense: 90 tablet; Refill: 3             Bradycardia: Noted today. Will decrease metoprolol to 12.5mg daily. She was urged she may add extra 1/2tablet of BB if needed for better " palpitation control.     HTN: BP mildly elevated today, she admits at home well controlled. For now, no adjustment to current norvasc, lisinopril, HCTZ advised. If elevation noted at home, they will call for further recommendations. Limited sodium urged.     Hyperlipidemia: On statin therapy with crestor. She has tolerated well. FLP has been done with your office, no current available for review, will have again this fall. Limited carbs urged. Can I have for review?      AS/atypical chest pain/palpitations: Echo advised to assess GUANAKITO and LV function.     Refills per request.      BMI noted at 23.21, good cardiac diet urged.      6 month follow up advised, or sooner if needed.          Problems Addressed this Visit        Cardiac and Vasculature    Essential hypertension    Relevant Medications    metoprolol tartrate (LOPRESSOR) 25 MG tablet    S/P AVR    Palpitations    Hypercholesteremia    Bradycardia, sinus    Relevant Medications    metoprolol tartrate (LOPRESSOR) 25 MG tablet   Other Visit Diagnoses     Aortic stenosis, moderate    -  Primary    Relevant Medications    metoprolol tartrate (LOPRESSOR) 25 MG tablet    Other Relevant Orders    Adult Transthoracic Echo Complete W/ Cont if Necessary Per Protocol    Chest discomfort        Relevant Orders    Adult Transthoracic Echo Complete W/ Cont if Necessary Per Protocol      Diagnoses       Codes Comments    Aortic stenosis, moderate    -  Primary ICD-10-CM: I35.0  ICD-9-CM: 424.1     Chest discomfort     ICD-10-CM: R07.89  ICD-9-CM: 786.59     Bradycardia, sinus     ICD-10-CM: R00.1  ICD-9-CM: 427.89     Palpitations     ICD-10-CM: R00.2  ICD-9-CM: 785.1     Essential hypertension     ICD-10-CM: I10  ICD-9-CM: 401.9     Hypercholesteremia     ICD-10-CM: E78.00  ICD-9-CM: 272.0     S/P AVR     ICD-10-CM: Z95.2  ICD-9-CM: V43.3           BMI is within normal parameters. No other follow-up for BMI required.              Electronically signed by Monse Toney  APRN May 31, 2023 14:36 EDT

## 2023-06-14 ENCOUNTER — HOSPITAL ENCOUNTER (OUTPATIENT)
Dept: CARDIOLOGY | Facility: HOSPITAL | Age: 88
Discharge: HOME | End: 2023-06-14
Admitting: NURSE PRACTITIONER
Payer: MEDICARE

## 2023-06-14 VITALS — HEIGHT: 64 IN | BODY MASS INDEX: 23.07 KG/M2 | WEIGHT: 135.14 LBS

## 2023-06-14 DIAGNOSIS — R07.89 CHEST DISCOMFORT: ICD-10-CM

## 2023-06-14 DIAGNOSIS — I35.0 AORTIC STENOSIS, MODERATE: ICD-10-CM

## 2023-06-14 LAB
AORTIC DIMENSIONLESS INDEX: 0.38 (DI)
BH CV ECHO MEAS - ACS: 0.91 CM
BH CV ECHO MEAS - AO MAX PG: 27 MMHG
BH CV ECHO MEAS - AO MEAN PG: 13.5 MMHG
BH CV ECHO MEAS - AO ROOT DIAM: 3.1 CM
BH CV ECHO MEAS - AO V2 MAX: 259.9 CM/SEC
BH CV ECHO MEAS - AO V2 VTI: 59.3 CM
BH CV ECHO MEAS - AVA(I,D): 1.27 CM2
BH CV ECHO MEAS - EDV(CUBED): 96.5 ML
BH CV ECHO MEAS - ESV(CUBED): 19.7 ML
BH CV ECHO MEAS - FS: 41.1 %
BH CV ECHO MEAS - IVS/LVPW: 0.98 CM
BH CV ECHO MEAS - IVSD: 0.98 CM
BH CV ECHO MEAS - LA DIMENSION: 4.3 CM
BH CV ECHO MEAS - LAT PEAK E' VEL: 10.5 CM/SEC
BH CV ECHO MEAS - LV MASS(C)D: 156 GRAMS
BH CV ECHO MEAS - LV MAX PG: 3.4 MMHG
BH CV ECHO MEAS - LV MEAN PG: 1.71 MMHG
BH CV ECHO MEAS - LV V1 MAX: 92.7 CM/SEC
BH CV ECHO MEAS - LV V1 VTI: 24.7 CM
BH CV ECHO MEAS - LVIDD: 4.6 CM
BH CV ECHO MEAS - LVIDS: 2.7 CM
BH CV ECHO MEAS - LVOT AREA: 3 CM2
BH CV ECHO MEAS - LVOT DIAM: 1.97 CM
BH CV ECHO MEAS - LVPWD: 1 CM
BH CV ECHO MEAS - MED PEAK E' VEL: 7.7 CM/SEC
BH CV ECHO MEAS - MV A MAX VEL: 79.9 CM/SEC
BH CV ECHO MEAS - MV DEC SLOPE: 531.9 CM/SEC2
BH CV ECHO MEAS - MV DEC TIME: 0.18 MSEC
BH CV ECHO MEAS - MV E MAX VEL: 99.7 CM/SEC
BH CV ECHO MEAS - MV E/A: 1.25
BH CV ECHO MEAS - MV MAX PG: 5.6 MMHG
BH CV ECHO MEAS - MV MEAN PG: 1.43 MMHG
BH CV ECHO MEAS - MV P1/2T: 68.1 MSEC
BH CV ECHO MEAS - MV V2 VTI: 41.1 CM
BH CV ECHO MEAS - MVA(P1/2T): 3.2 CM2
BH CV ECHO MEAS - MVA(VTI): 1.83 CM2
BH CV ECHO MEAS - PA V2 MAX: 109.2 CM/SEC
BH CV ECHO MEAS - RAP SYSTOLE: 8 MMHG
BH CV ECHO MEAS - RV MAX PG: 2.36 MMHG
BH CV ECHO MEAS - RV V1 MAX: 76.8 CM/SEC
BH CV ECHO MEAS - RV V1 VTI: 19.9 CM
BH CV ECHO MEAS - RVDD: 2.8 CM
BH CV ECHO MEAS - RVSP: 38.3 MMHG
BH CV ECHO MEAS - SV(LVOT): 75.2 ML
BH CV ECHO MEAS - TAPSE (>1.6): 1.88 CM
BH CV ECHO MEAS - TR MAX PG: 30.3 MMHG
BH CV ECHO MEAS - TR MAX VEL: 275.3 CM/SEC
BH CV ECHO MEASUREMENTS AVERAGE E/E' RATIO: 10.96
BH CV XLRA - TDI S': 11.1 CM/SEC
SINUS: 3 CM

## 2023-06-14 PROCEDURE — 93306 TTE W/DOPPLER COMPLETE: CPT

## 2023-06-14 PROCEDURE — 93306 TTE W/DOPPLER COMPLETE: CPT | Performed by: INTERNAL MEDICINE

## 2024-03-21 ENCOUNTER — OFFICE VISIT (OUTPATIENT)
Dept: CARDIOLOGY | Facility: CLINIC | Age: 89
End: 2024-03-21
Payer: MEDICARE

## 2024-03-21 VITALS
HEIGHT: 63 IN | HEART RATE: 54 BPM | SYSTOLIC BLOOD PRESSURE: 160 MMHG | BODY MASS INDEX: 24.56 KG/M2 | WEIGHT: 138.6 LBS | DIASTOLIC BLOOD PRESSURE: 64 MMHG

## 2024-03-21 DIAGNOSIS — E78.00 HYPERCHOLESTEREMIA: ICD-10-CM

## 2024-03-21 DIAGNOSIS — I10 ESSENTIAL HYPERTENSION: ICD-10-CM

## 2024-03-21 DIAGNOSIS — R07.89 CHEST DISCOMFORT: ICD-10-CM

## 2024-03-21 DIAGNOSIS — I35.0 AORTIC VALVE STENOSIS, ETIOLOGY OF CARDIAC VALVE DISEASE UNSPECIFIED: ICD-10-CM

## 2024-03-21 DIAGNOSIS — Z95.2 S/P AVR: ICD-10-CM

## 2024-03-21 DIAGNOSIS — R00.1 BRADYCARDIA, SINUS: Primary | ICD-10-CM

## 2024-03-21 DIAGNOSIS — R42 DIZZINESS: ICD-10-CM

## 2024-03-21 DIAGNOSIS — R00.2 PALPITATIONS: ICD-10-CM

## 2024-03-21 PROCEDURE — 99214 OFFICE O/P EST MOD 30 MIN: CPT | Performed by: NURSE PRACTITIONER

## 2024-03-21 RX ORDER — LISINOPRIL 20 MG/1
20 TABLET ORAL DAILY
Qty: 90 TABLET | Refills: 2 | Status: SHIPPED | OUTPATIENT
Start: 2024-03-21

## 2024-03-21 RX ORDER — HYDROCHLOROTHIAZIDE 25 MG/1
25 TABLET ORAL DAILY
Qty: 90 TABLET | Refills: 2 | Status: SHIPPED | OUTPATIENT
Start: 2024-03-21

## 2024-03-21 RX ORDER — AMLODIPINE BESYLATE 5 MG/1
5 TABLET ORAL 2 TIMES DAILY
Qty: 180 TABLET | Refills: 2 | Status: SHIPPED | OUTPATIENT
Start: 2024-03-21

## 2024-03-21 NOTE — PROGRESS NOTES
Chief Complaint   Patient presents with    Follow-up     Pt is here for cardiac follow up.  Pt states she does have some CP, she describes as burning.  She also reports occasional dizziness.  She denies SOB or palpitations.  Pt does take a daily ASA.    Med Refill     Pt request 90 day refills to be sent to [facility].  Medications were verified by med bottles.      Lab Work     Pt states their last labs were in December with her PCP.         Cardiac Complaints  chest pressure/discomfort and Dizziness      Subjective   Laurel Bianchi is a 89 y.o. female with normal coronaries, aortic insufficiency/aortic stenosis for which she underwent AVR and aortic root replacement in August 2008. She has been managed with aspirin, statin, antihypertensives and has done very well. Echocardiogram in May 2021 showed normal LV function, GUANAKITO 1.4 cm².  Holter in April 2021 showed average heart rate 53, for short runs of SVT.  Metoprolol was later decreased by PCP due to bradycardia, but she felt symptoms of palpitations worsened, and she began taking it at higher dosing. Echo 2023 showed GUANAKITO of 1.4cm2, EF 70%. BB decreased 2023 for bradycardia.    She comes today for follow up and continues to have CP that comes on occasion, mostly when she is anxious. No SOA, palpitations, or syncope noted. Dizziness noted rarely. Labs with PCP, checked in December. Refills requested.         Cardiac History  Past Surgical History:   Procedure Laterality Date    CARDIAC CATHETERIZATION  08/26/2008     Cath- Normal Coronaries. GUANAKITO- 1 Cm2, Mod AI, Dil AO     CARDIOVASCULAR STRESS TEST  11/15/2011     L. Myoview- Negative     CARDIOVASCULAR STRESS TEST  06/05/2013    L. Myoview- negative for ischemia     CONVERTED (HISTORICAL) HOLTER  02/28/2012     Holter- AVG HR HR 65 BPM.     CONVERTED (HISTORICAL) HOLTER  08/19/2019    14 days. AVG-57. . 23 runs of SVT. Longest- 20 beats    CONVERTED (HISTORICAL) HOLTER  04/29/2021    3 Days. AVG 53. . 4 runs of  SVT     ECHO - CONVERTED  08/25/2008    Echo- ([facility]) EF 65%. GUANAKITO- 0.8 Cm2     ECHO - CONVERTED  06/02/2009    Echo- EF >60%, GUANAKITO 1.7cm2.     ECHO - CONVERTED  09/14/2010     Echo- EF >60%. GUANAKITO 1.5cm2,     ECHO - CONVERTED  10/12/2011    Echo- EF 65%. GUANAKITO- 1.5 Cm2     ECHO - CONVERTED  02/14/2013    Echo- EF 65%. GUANAKITO- 1.5 Cm2     ECHO - CONVERTED  05/12/2015    Echo- EF 65%, mod AS, mild to mod MR, RVSP 28 mmHg     ECHO - CONVERTED  11/17/2016    EF 60-65%, mild AS (GUANAKITO 1.73), trace MR    ECHO - CONVERTED  08/02/2018    EF 55-60%. GUANAKITO- 1.3 Cm2. RVSP- 33 mmHg.    ECHO - CONVERTED  02/26/2020    EF 60%. LA- 4.5 Cm. AVR. GUANAKITO- 1.4 Cm2. Trace-Mild AI. Mild MR. RVSP- 36 mmHg.    ECHO - CONVERTED  05/10/2021    EF 65%. LA- 4.4 Cm. AVR. GUANAKITO- 1.4 Cm2. Trace-Mild MR. RVSP- 33 mmHg    ECHO - CONVERTED  06/14/2023    TLS. EF 70%. LA- 4.3. AVR. GUANAKITO- 1.4.14/27. Trace-Mild MR & AI. RVSP- 39 mmHg    OTHER SURGICAL HISTORY  08/29/2008    AVR with #21 Pericardial Valve & AO Root replacment with 28mm hemashield graft     OTHER SURGICAL HISTORY  09/04/2008     R-thoracentesis     OTHER SURGICAL HISTORY  05/31/2013    Carotid U/S-no significant hemodynamic stenosis, < 50%    OTHER SURGICAL HISTORY  2013    Telerhythmics- Sinus rhythm with ave HR 77 bpm.        Current Outpatient Medications   Medication Sig Dispense Refill    allopurinol (ZYLOPRIM) 100 MG tablet Take 1 tablet by mouth Daily.      aspirin 81 MG EC tablet Take 1 tablet by mouth Daily.      calcitriol (ROCALTROL) 0.25 MCG capsule Take 1 capsule daily      Calcium Carb-Cholecalciferol (CALCIUM + D3) 600-200 MG-UNIT tablet Take  by mouth Daily.      doxepin (SINEquan) 10 MG capsule Take 1 capsule by mouth Every Night.      hydroCHLOROthiazide 25 MG tablet Take 1 tablet by mouth Daily. 90 tablet 2    lisinopril (PRINIVIL,ZESTRIL) 20 MG tablet Take 1 tablet by mouth Daily. 90 tablet 2    LORazepam (ATIVAN) 0.5 MG tablet Take 1 tablet by mouth 2 (Two) Times a Day As Needed for  Anxiety.      metoprolol tartrate (LOPRESSOR) 25 MG tablet 1/2 tablet AM, may use extra 1/2 tablet in PM if needed for palpitations. 90 tablet 3    Omega-3 Fatty Acids (FISH OIL) 1200 MG capsule delayed-release Take  by mouth Daily.      pantoprazole (PROTONIX) 40 MG EC tablet Take 1 tablet by mouth Daily.      potassium chloride (K-DUR) 10 MEQ CR tablet Take 1 tablet by mouth Every Other Day.      rosuvastatin (CRESTOR) 10 MG tablet Take  by mouth Daily.      traZODone (DESYREL) 50 MG tablet Take 1 tablet by mouth Every Night.      amLODIPine (NORVASC) 5 MG tablet Take 1 tablet by mouth 2 (Two) Times a Day. 180 tablet 2     No current facility-administered medications for this visit.       Patient has no known allergies.    Past Medical History:   Diagnosis Date    Abnormal PFT     3-19-09 PFT -- abnormal --- followed by Dr. Givens    Abnormal PFT     7-07-09: PFT-- Mild restrictive vent. defect. Insig. BD response.    Anxiety and depression     Gout     History of skin cancer     resection of the nose.    Hypercholesterolemia     Hypertension     Migraines     Partial Thyroidectomy     Syncope     with severe aortic stenosis, S/P AVR       Social History     Socioeconomic History    Marital status:    Tobacco Use    Smoking status: Never    Smokeless tobacco: Never   Vaping Use    Vaping status: Never Used   Substance and Sexual Activity    Alcohol use: No    Drug use: No       Family History   Problem Relation Age of Onset    Heart disease Mother     Hypertension Mother     Diabetes Mother     Heart disease Father     Other Father         lung problems    Heart disease Other     Hypertension Other     Hyperlipidemia Other        Review of Systems   Constitutional: Negative for malaise/fatigue and night sweats.   Cardiovascular:  Positive for chest pain. Negative for claudication, cyanosis, dyspnea on exertion, irregular heartbeat, leg swelling, near-syncope, orthopnea, palpitations and syncope.  "  Respiratory:  Negative for cough, shortness of breath and wheezing.    Musculoskeletal:  Negative for back pain, joint pain and stiffness.   Gastrointestinal:  Negative for anorexia, heartburn, nausea and vomiting.   Genitourinary:  Negative for dysuria, hematuria, hesitancy and nocturia.   Neurological:  Positive for dizziness and light-headedness.   Psychiatric/Behavioral:  Negative for depression and memory loss. The patient is not nervous/anxious.            Objective     /64 (BP Location: Left arm, Patient Position: Sitting)   Pulse 54   Ht 160 cm (63\")   Wt 62.9 kg (138 lb 9.6 oz)   BMI 24.55 kg/m²     Constitutional:       Appearance: Not in distress.   Eyes:      Pupils: Pupils are equal, round, and reactive to light.   HENT:      Nose: Nose normal.   Pulmonary:      Effort: Pulmonary effort is normal.      Breath sounds: Normal breath sounds.   Cardiovascular:      PMI at left midclavicular line. Bradycardia present. Regular rhythm.      Murmurs: There is a systolic murmur.   Abdominal:      Palpations: Abdomen is soft.   Musculoskeletal: Normal range of motion.      Cervical back: Neck supple. Skin:     General: Skin is warm and dry.   Neurological:      Mental Status: Alert.         Procedures         Diagnoses and all orders for this visit:    1. Bradycardia, sinus (Primary)  -     Stress Test With Myocardial Perfusion One Day; Future  -     Adult Transthoracic Echo Complete W/ Cont if Necessary Per Protocol; Future    2. Chest discomfort  -     Stress Test With Myocardial Perfusion One Day; Future  -     Adult Transthoracic Echo Complete W/ Cont if Necessary Per Protocol; Future    3. Essential hypertension  -     Stress Test With Myocardial Perfusion One Day; Future    4. S/P AVR  -     Adult Transthoracic Echo Complete W/ Cont if Necessary Per Protocol; Future    5. Aortic valve stenosis, etiology of cardiac valve disease unspecified  -     Adult Transthoracic Echo Complete W/ Cont if " Necessary Per Protocol; Future    6. Palpitations    7. Dizziness    8. Hypercholesteremia    Other orders  -     amLODIPine (NORVASC) 5 MG tablet; Take 1 tablet by mouth 2 (Two) Times a Day.  Dispense: 180 tablet; Refill: 2  -     hydroCHLOROthiazide 25 MG tablet; Take 1 tablet by mouth Daily.  Dispense: 90 tablet; Refill: 2  -     lisinopril (PRINIVIL,ZESTRIL) 20 MG tablet; Take 1 tablet by mouth Daily.  Dispense: 90 tablet; Refill: 2               Bradycardia: Noted today, but improved since lowering dose last visit. Will continue same. She was urged she may add extra 1/2tablet of BB if needed for better palpitation control.     HTN: BP elevated today. For now, no adjustment to current lisinopril or HCTZ advised. Norvasc increased to 5mg BID. Limited sodium urged.      Hyperlipidemia: On statin therapy with crestor. She has tolerated well. FLP has been done with your office, no current available for review, limited carbs urged. Can I have for review?      AS/atypical chest pain/palpitations/AVR: Echo advised to assess GUANAKITO and LV function. Stress testing recommended for cardiac evaluation to assess for ischemia as it has been 10 years since last cardiac stress testing. More recommendations to follow.     Refills per request.      BMI noted at 23.21, good cardiac diet urged.      6 month follow up advised, or sooner if needed.             Problems Addressed this Visit          Cardiac and Vasculature    Essential hypertension    Relevant Medications    amLODIPine (NORVASC) 5 MG tablet    hydroCHLOROthiazide 25 MG tablet    lisinopril (PRINIVIL,ZESTRIL) 20 MG tablet    Other Relevant Orders    Stress Test With Myocardial Perfusion One Day    AS (aortic stenosis)    Relevant Medications    amLODIPine (NORVASC) 5 MG tablet    Other Relevant Orders    Adult Transthoracic Echo Complete W/ Cont if Necessary Per Protocol    S/P AVR    Relevant Orders    Adult Transthoracic Echo Complete W/ Cont if Necessary Per Protocol     Palpitations    Hypercholesteremia    Bradycardia, sinus - Primary    Relevant Medications    amLODIPine (NORVASC) 5 MG tablet    Other Relevant Orders    Stress Test With Myocardial Perfusion One Day    Adult Transthoracic Echo Complete W/ Cont if Necessary Per Protocol     Other Visit Diagnoses       Chest discomfort        Relevant Orders    Stress Test With Myocardial Perfusion One Day    Adult Transthoracic Echo Complete W/ Cont if Necessary Per Protocol    Dizziness              Diagnoses         Codes Comments    Bradycardia, sinus    -  Primary ICD-10-CM: R00.1  ICD-9-CM: 427.89     Chest discomfort     ICD-10-CM: R07.89  ICD-9-CM: 786.59     Essential hypertension     ICD-10-CM: I10  ICD-9-CM: 401.9     S/P AVR     ICD-10-CM: Z95.2  ICD-9-CM: V43.3     Aortic valve stenosis, etiology of cardiac valve disease unspecified     ICD-10-CM: I35.0  ICD-9-CM: 424.1     Palpitations     ICD-10-CM: R00.2  ICD-9-CM: 785.1     Dizziness     ICD-10-CM: R42  ICD-9-CM: 780.4     Hypercholesteremia     ICD-10-CM: E78.00  ICD-9-CM: 272.0             BMI is within normal parameters. No other follow-up for BMI required.                Electronically signed by Monse Toney, SLIME March 21, 2024 15:12 EDT

## 2024-09-30 ENCOUNTER — TELEPHONE (OUTPATIENT)
Dept: CARDIOLOGY | Facility: CLINIC | Age: 89
End: 2024-09-30
Payer: MEDICARE

## 2024-09-30 NOTE — TELEPHONE ENCOUNTER
Caller: MOISES LUA    Relationship to patient: Emergency Contact    Best call back number: [phone]    Chief complaint:     Type of visit: FOLLOW UP    Requested date: ASAP     If rescheduling, when is the original appointment: 10-1-24     Additional notes:PATIENT IS WANTING TO RESCHEDULE WITH MALIKA KERR, APRN. NO AVAILABILITY UNTIL APRIL 2025. PLEASE REACH OUT TO SCHEDULE.

## 2024-12-12 ENCOUNTER — OFFICE VISIT (OUTPATIENT)
Dept: CARDIOLOGY | Facility: CLINIC | Age: 89
End: 2024-12-12
Payer: MEDICARE

## 2024-12-12 VITALS
SYSTOLIC BLOOD PRESSURE: 142 MMHG | BODY MASS INDEX: 24.8 KG/M2 | WEIGHT: 140 LBS | HEIGHT: 63 IN | DIASTOLIC BLOOD PRESSURE: 64 MMHG | HEART RATE: 60 BPM

## 2024-12-12 DIAGNOSIS — I10 ESSENTIAL HYPERTENSION: ICD-10-CM

## 2024-12-12 DIAGNOSIS — Z95.2 S/P AVR: Primary | ICD-10-CM

## 2024-12-12 DIAGNOSIS — I35.0 AORTIC VALVE STENOSIS, ETIOLOGY OF CARDIAC VALVE DISEASE UNSPECIFIED: ICD-10-CM

## 2024-12-12 DIAGNOSIS — E78.00 HYPERCHOLESTEREMIA: ICD-10-CM

## 2024-12-12 DIAGNOSIS — R07.89 CHEST DISCOMFORT: ICD-10-CM

## 2024-12-12 RX ORDER — METOPROLOL TARTRATE 25 MG/1
25 TABLET, FILM COATED ORAL DAILY
Qty: 90 TABLET | Refills: 3 | Status: SHIPPED | OUTPATIENT
Start: 2024-12-12

## 2024-12-12 RX ORDER — GABAPENTIN 100 MG/1
100 CAPSULE ORAL NIGHTLY
COMMUNITY

## 2024-12-12 RX ORDER — HYDROCHLOROTHIAZIDE 25 MG/1
12.5 TABLET ORAL DAILY
Qty: 45 TABLET | Refills: 3 | Status: SHIPPED | OUTPATIENT
Start: 2024-12-12

## 2024-12-12 RX ORDER — AMLODIPINE BESYLATE 5 MG/1
5 TABLET ORAL DAILY
Qty: 90 TABLET | Refills: 3 | Status: SHIPPED | OUTPATIENT
Start: 2024-12-12

## 2024-12-12 RX ORDER — LISINOPRIL 20 MG/1
20 TABLET ORAL DAILY
Qty: 90 TABLET | Refills: 3 | Status: SHIPPED | OUTPATIENT
Start: 2024-12-12

## 2024-12-12 NOTE — PROGRESS NOTES
"Chief Complaint   Patient presents with    Follow-up     Cardiac management    Lab     Last labs a week ago per PCP.    Chest Pain     Reports having \"catching pain in left chest\", relieved with rest for a few minutes. PCP felt could be related to indigestion.    Edema     Has swelling in ankles, wearing support hose. Relieved with elevation.    Med Refill     Needs refills on cardiac medications-90 day        HPI:  HPI   Laurel Bianchi is a 89 y.o. female with normal coronaries, aortic insufficiency/aortic stenosis for which she underwent AVR and aortic root replacement in August 2008. She has been managed with aspirin, statin, antihypertensives and has done very well. Echocardiogram in May 2021 showed normal LV function, GUANAKITO 1.4 cm².  Holter in April 2021 showed average heart rate 53, for short runs of SVT.  Metoprolol was later decreased by PCP due to bradycardia, but she felt symptoms of palpitations worsened, and she began taking it at higher dosing. Echo 2023 showed GUANAKITO of 1.4cm2, EF 70%. BB decreased 2023 for bradycardia.     She returns today for follow-up visit. Labs about a week ago with PCP and reported good. Patient denies chest pain, pressure, palpitations, tightness, dizziness, shortness of air. She reports having a \"catching feeling\" every couple of weeks that seem to be either musculoskeletal or gas pain. She is accompanied by granddaughter.  Echocardiogram ordered after last visit 3/2024 to evaluate GUANAKITO as she is status post AV replacement.  The aortic valve area has remained unchanged for years.  She did not pleat the echocardiogram earlier this year.  We did discuss this during this visit and decided that we would revisit this at the next visit.    Cardiac History:    Past Surgical History:   Procedure Laterality Date    CARDIAC CATHETERIZATION  08/26/2008     Cath- Normal Coronaries. GUANAKITO- 1 Cm2, Mod AI, Dil AO     CARDIOVASCULAR STRESS TEST  11/15/2011     L. Myoview- Negative     CARDIOVASCULAR " STRESS TEST  06/05/2013    L. Myoview- negative for ischemia     CONVERTED (HISTORICAL) HOLTER  02/28/2012     Holter- AVG HR HR 65 BPM.     CONVERTED (HISTORICAL) HOLTER  08/19/2019    14 days. AVG-57. . 23 runs of SVT. Longest- 20 beats    CONVERTED (HISTORICAL) HOLTER  04/29/2021    3 Days. AVG 53. . 4 runs of SVT     ECHO - CONVERTED  08/25/2008    Echo- ([facility]) EF 65%. GUANAKITO- 0.8 Cm2     ECHO - CONVERTED  06/02/2009    Echo- EF >60%, GUANAKITO 1.7cm2.     ECHO - CONVERTED  09/14/2010     Echo- EF >60%. GUANAKITO 1.5cm2,     ECHO - CONVERTED  10/12/2011    Echo- EF 65%. GUANAKITO- 1.5 Cm2     ECHO - CONVERTED  02/14/2013    Echo- EF 65%. GUANAKITO- 1.5 Cm2     ECHO - CONVERTED  05/12/2015    Echo- EF 65%, mod AS, mild to mod MR, RVSP 28 mmHg     ECHO - CONVERTED  11/17/2016    EF 60-65%, mild AS (GUANAKITO 1.73), trace MR    ECHO - CONVERTED  08/02/2018    EF 55-60%. GUANAKITO- 1.3 Cm2. RVSP- 33 mmHg.    ECHO - CONVERTED  02/26/2020    EF 60%. LA- 4.5 Cm. AVR. GUANAKITO- 1.4 Cm2. Trace-Mild AI. Mild MR. RVSP- 36 mmHg.    ECHO - CONVERTED  05/10/2021    EF 65%. LA- 4.4 Cm. AVR. GUANAKITO- 1.4 Cm2. Trace-Mild MR. RVSP- 33 mmHg    ECHO - CONVERTED  06/14/2023    TLS. EF 70%. LA- 4.3. AVR. GUANAKITO- 1.4.14/27. Trace-Mild MR & AI. RVSP- 39 mmHg    OTHER SURGICAL HISTORY  08/29/2008    AVR with #21 Pericardial Valve & AO Root replacment with 28mm hemashield graft     OTHER SURGICAL HISTORY  09/04/2008     R-thoracentesis     OTHER SURGICAL HISTORY  05/31/2013    Carotid U/S-no significant hemodynamic stenosis, < 50%    OTHER SURGICAL HISTORY  2013    Telerhythmics- Sinus rhythm with ave HR 77 bpm.        Current Outpatient Medications   Medication Sig Dispense Refill    allopurinol (ZYLOPRIM) 100 MG tablet Take 1 tablet by mouth Daily.      amLODIPine (NORVASC) 5 MG tablet Take 1 tablet by mouth Daily. 90 tablet 3    aspirin 81 MG EC tablet Take 1 tablet by mouth Daily.      calcitriol (ROCALTROL) 0.25 MCG capsule Take 1 capsule daily      Calcium  Carb-Cholecalciferol (CALCIUM + D3) 600-200 MG-UNIT tablet Take  by mouth Daily.      doxepin (SINEquan) 10 MG capsule Take 1 capsule by mouth Every Night.      gabapentin (NEURONTIN) 100 MG capsule Take 1 capsule by mouth Every Night.      hydroCHLOROthiazide 25 MG tablet Take 0.5 tablets by mouth Daily. 45 tablet 3    lisinopril (PRINIVIL,ZESTRIL) 20 MG tablet Take 1 tablet by mouth Daily. 90 tablet 3    LORazepam (ATIVAN) 0.5 MG tablet Take 1 tablet by mouth 2 (Two) Times a Day As Needed for Anxiety.      metoprolol tartrate (LOPRESSOR) 25 MG tablet Take 1 tablet by mouth Daily. 90 tablet 3    Omega-3 Fatty Acids (FISH OIL) 1200 MG capsule delayed-release Take  by mouth Daily.      pantoprazole (PROTONIX) 40 MG EC tablet Take 1 tablet by mouth Daily.      potassium chloride (K-DUR) 10 MEQ CR tablet Take 1 tablet by mouth Daily.      rosuvastatin (CRESTOR) 10 MG tablet Take  by mouth Every Night.       No current facility-administered medications for this visit.       Patient has no known allergies.    Past Medical History:   Diagnosis Date    Abnormal PFT     3-19-09 PFT -- abnormal --- followed by Dr. Givens    Abnormal PFT     7-07-09: PFT-- Mild restrictive vent. defect. Insig. BD response.    Anxiety and depression     Gout     History of skin cancer     resection of the nose.    Hypercholesterolemia     Hypertension     Migraines     Partial Thyroidectomy     Syncope     with severe aortic stenosis, S/P AVR       Social History     Socioeconomic History    Marital status:    Tobacco Use    Smoking status: Never     Passive exposure: Past    Smokeless tobacco: Never   Vaping Use    Vaping status: Never Used   Substance and Sexual Activity    Alcohol use: No    Drug use: No    Sexual activity: Defer       Family History   Problem Relation Age of Onset    Heart disease Mother     Hypertension Mother     Diabetes Mother     Heart disease Father     Other Father         lung problems    Heart disease  "Other     Hypertension Other     Hyperlipidemia Other        Vitals:   /64 (BP Location: Left arm, Patient Position: Sitting)   Pulse 60   Ht 160 cm (62.99\")   Wt 63.5 kg (140 lb)   BMI 24.81 kg/m²     Physical Exam  Vitals and nursing note reviewed.   Neck:      Vascular: No carotid bruit.   Cardiovascular:      Rate and Rhythm: Normal rate and regular rhythm.      Pulses: Normal pulses.      Heart sounds: Normal heart sounds. Murmur heard.       with a grade of 4/6.      No friction rub. No gallop.   Pulmonary:      Effort: Pulmonary effort is normal.      Breath sounds: Normal breath sounds and air entry.   Musculoskeletal:      Right lower leg: No edema.      Left lower leg: No edema.   Skin:     General: Skin is warm and dry.      Capillary Refill: Capillary refill takes less than 2 seconds.   Neurological:      Mental Status: She is alert and oriented to person, place, and time.       Procedures     Assessment & Plan     Diagnoses and all orders for this visit:    1. S/P AVR (Primary)    2. Aortic valve stenosis, etiology of cardiac valve disease unspecified    3. Chest discomfort    4. Essential hypertension    5. Hypercholesteremia    Other orders  -     amLODIPine (NORVASC) 5 MG tablet; Take 1 tablet by mouth Daily.  Dispense: 90 tablet; Refill: 3  -     hydroCHLOROthiazide 25 MG tablet; Take 0.5 tablets by mouth Daily.  Dispense: 45 tablet; Refill: 3  -     lisinopril (PRINIVIL,ZESTRIL) 20 MG tablet; Take 1 tablet by mouth Daily.  Dispense: 90 tablet; Refill: 3  -     metoprolol tartrate (LOPRESSOR) 25 MG tablet; Take 1 tablet by mouth Daily.  Dispense: 90 tablet; Refill: 3    Status post AVR/AV stenosis  - GUANAKITO has remained unchanged for last several echocardiograms  - Plan echocardiogram for surveillance of GUANAKITO at next visit    Hypertension  - Blood pressure slightly elevated  - Continue current antihypertensive medication regimen.  No medication changes made today    Hypercholesteremia  - Labs " managed with PCP  - Continue rosuvastatin as prescribed currently    Chest discomfort  - As determined with the patient and granddaughter discussion; musculoskeletal in nature and urged to call the office if symptoms worsen or increase in nature    Stable from a cardiac standpoint. No further testing recommended at this time. No medication changes made today. No refills needed.  Plan echo for surveillance GUANAKITO at next visit.      Visit Diagnoses:    ICD-10-CM ICD-9-CM   1. S/P AVR  Z95.2 V43.3   2. Aortic valve stenosis, etiology of cardiac valve disease unspecified  I35.0 424.1   3. Chest discomfort  R07.89 786.59   4. Essential hypertension  I10 401.9   5. Hypercholesteremia  E78.00 272.0       Meds Ordered During Visit:  New Medications Ordered This Visit   Medications    amLODIPine (NORVASC) 5 MG tablet     Sig: Take 1 tablet by mouth Daily.     Dispense:  90 tablet     Refill:  3    hydroCHLOROthiazide 25 MG tablet     Sig: Take 0.5 tablets by mouth Daily.     Dispense:  45 tablet     Refill:  3    lisinopril (PRINIVIL,ZESTRIL) 20 MG tablet     Sig: Take 1 tablet by mouth Daily.     Dispense:  90 tablet     Refill:  3    metoprolol tartrate (LOPRESSOR) 25 MG tablet     Sig: Take 1 tablet by mouth Daily.     Dispense:  90 tablet     Refill:  3       Follow Up Appointment:   Return in about 6 months (around 6/12/2025), or if symptoms worsen or fail to improve.           This document has been electronically signed by SLIME Maddox  December 17, 2024 11:01 EST    Dictated Utilizing Dragon Dictation: Part of this note may be an electronic transcription/translation of spoken language to printed text using the Dragon Dictation System.

## 2024-12-19 RX ORDER — AMLODIPINE BESYLATE 5 MG/1
5 TABLET ORAL 2 TIMES DAILY
Qty: 180 TABLET | Refills: 2 | OUTPATIENT
Start: 2024-12-19

## 2025-02-03 RX ORDER — HYDROCHLOROTHIAZIDE 25 MG/1
25 TABLET ORAL DAILY
Qty: 90 TABLET | Refills: 1 | Status: SHIPPED | OUTPATIENT
Start: 2025-02-03

## 2025-06-26 ENCOUNTER — OFFICE VISIT (OUTPATIENT)
Dept: CARDIOLOGY | Facility: CLINIC | Age: OVER 89
End: 2025-06-26
Payer: MEDICARE

## 2025-06-26 VITALS
HEIGHT: 63 IN | DIASTOLIC BLOOD PRESSURE: 60 MMHG | SYSTOLIC BLOOD PRESSURE: 146 MMHG | HEART RATE: 54 BPM | BODY MASS INDEX: 24.8 KG/M2 | WEIGHT: 140 LBS

## 2025-06-26 DIAGNOSIS — Z95.2 S/P AVR: Primary | ICD-10-CM

## 2025-06-26 DIAGNOSIS — E78.00 HYPERCHOLESTEREMIA: ICD-10-CM

## 2025-06-26 DIAGNOSIS — I10 ESSENTIAL HYPERTENSION: ICD-10-CM

## 2025-06-26 DIAGNOSIS — R00.1 BRADYCARDIA, SINUS: ICD-10-CM

## 2025-06-26 NOTE — PROGRESS NOTES
"Chief Complaint   Patient presents with    Follow-up     Cardiac management    LABS     Had labs April 2025 with Dr Madison . Patient reports al in normal range     Med Refill     Patients granddaughter said no refills needed today, will call when refills needed       Subjective       Laurel Bianchi is a 89 y.o. female with normal coronaries, aortic insufficiency/aortic stenosis for which she underwent AVR and aortic root replacement in August 2008. She has been managed with aspirin, statin, antihypertensives and has done very well. Echocardiogram in May 2021 showed normal LV function, GUANAKITO 1.4 cm².  Holter in April 2021 showed average heart rate 53, for short runs of SVT.  Metoprolol was later decreased by PCP due to bradycardia, but she felt symptoms of palpitations worsened, and she began taking it at higher dosing. Echo 2023 showed GUANAKITO of 1.4cm2, EF 70%. BB decreased 2023 for bradycardia.     Today she returns to the office for a follow-up visit accompanied by her granddaughter.  No recent cardiac symptoms or concerns voiced.  She uses a walker for ambulation and no recent falls noted.  Appetite remains good and blood pressure stable.  The dose of hydrochlorothiazide was decreased to 12.5 mg daily, no other medication changes noted.  According to family member recent labs per PCP were \"good\".    Cardiac History:    Past Surgical History:   Procedure Laterality Date    CARDIAC CATHETERIZATION  08/26/2008     Cath- Normal Coronaries. GUANAKITO- 1 Cm2, Mod AI, Dil AO     CARDIOVASCULAR STRESS TEST  11/15/2011     L. Myoview- Negative     CARDIOVASCULAR STRESS TEST  06/05/2013    L. Myoview- negative for ischemia     CONVERTED (HISTORICAL) HOLTER  02/28/2012     Holter- AVG HR HR 65 BPM.     CONVERTED (HISTORICAL) HOLTER  08/19/2019    14 days. AVG-57. . 23 runs of SVT. Longest- 20 beats    CONVERTED (HISTORICAL) HOLTER  04/29/2021    3 Days. AVG 53. . 4 runs of SVT     ECHO - CONVERTED  08/25/2008    Echo- " ([facility]) EF 65%. GUANAKITO- 0.8 Cm2     ECHO - CONVERTED  06/02/2009    Echo- EF >60%, GUANAKITO 1.7cm2.     ECHO - CONVERTED  09/14/2010     Echo- EF >60%. GUANAKITO 1.5cm2,     ECHO - CONVERTED  10/12/2011    Echo- EF 65%. GUANAKITO- 1.5 Cm2     ECHO - CONVERTED  02/14/2013    Echo- EF 65%. GUANAKITO- 1.5 Cm2     ECHO - CONVERTED  05/12/2015    Echo- EF 65%, mod AS, mild to mod MR, RVSP 28 mmHg     ECHO - CONVERTED  11/17/2016    EF 60-65%, mild AS (GUANAKITO 1.73), trace MR    ECHO - CONVERTED  08/02/2018    EF 55-60%. GUANAKITO- 1.3 Cm2. RVSP- 33 mmHg.    ECHO - CONVERTED  02/26/2020    EF 60%. LA- 4.5 Cm. AVR. GUANAKITO- 1.4 Cm2. Trace-Mild AI. Mild MR. RVSP- 36 mmHg.    ECHO - CONVERTED  05/10/2021    EF 65%. LA- 4.4 Cm. AVR. GUANAKITO- 1.4 Cm2. Trace-Mild MR. RVSP- 33 mmHg    ECHO - CONVERTED  06/14/2023    TLS. EF 70%. LA- 4.3. AVR. GUANAKITO- 1.4.14/27. Trace-Mild MR & AI. RVSP- 39 mmHg    OTHER SURGICAL HISTORY  08/29/2008    AVR with #21 Pericardial Valve & AO Root replacment with 28mm hemashield graft     OTHER SURGICAL HISTORY  09/04/2008     R-thoracentesis     OTHER SURGICAL HISTORY  05/31/2013    Carotid U/S-no significant hemodynamic stenosis, < 50%    OTHER SURGICAL HISTORY  2013    Telerhythmics- Sinus rhythm with ave HR 77 bpm.        Current Outpatient Medications   Medication Sig Dispense Refill    allopurinol (ZYLOPRIM) 100 MG tablet Take 1 tablet by mouth Daily.      amLODIPine (NORVASC) 5 MG tablet Take 1 tablet by mouth Daily. 90 tablet 3    aspirin 81 MG EC tablet Take 1 tablet by mouth Daily.      calcitriol (ROCALTROL) 0.25 MCG capsule 1 capsule Every Other Day.      Calcium Carb-Cholecalciferol (CALCIUM + D3) 600-200 MG-UNIT tablet Take  by mouth Daily.      doxepin (SINEquan) 10 MG capsule Take 1 capsule by mouth Every Night.      gabapentin (NEURONTIN) 100 MG capsule Take 1 capsule by mouth Every Night.      hydroCHLOROthiazide 25 MG tablet TAKE 1 TABLET BY MOUTH EVERY DAY (Patient taking differently: Take 0.5 tablets by mouth Every Morning.) 90  tablet 1    lisinopril (PRINIVIL,ZESTRIL) 20 MG tablet Take 1 tablet by mouth Daily. 90 tablet 3    LORazepam (ATIVAN) 0.5 MG tablet Take 1 tablet by mouth 2 (Two) Times a Day As Needed for Anxiety.      metoprolol tartrate (LOPRESSOR) 25 MG tablet Take 1 tablet by mouth Daily. 90 tablet 3    Omega-3 Fatty Acids (FISH OIL) 1200 MG capsule delayed-release Take  by mouth Daily.      pantoprazole (PROTONIX) 40 MG EC tablet Take 1 tablet by mouth Daily.      potassium chloride (K-DUR) 10 MEQ CR tablet Take 1 tablet by mouth Daily.      rosuvastatin (CRESTOR) 10 MG tablet Take  by mouth Every Night.       No current facility-administered medications for this visit.       Patient has no known allergies.    Past Medical History:   Diagnosis Date    Abnormal PFT     3-19-09 PFT -- abnormal --- followed by Dr. Givens    Abnormal PFT     7-07-09: PFT-- Mild restrictive vent. defect. Insig. BD response.    Anxiety and depression     Gout     History of skin cancer     resection of the nose.    Hypercholesterolemia     Hypertension     Migraines     Partial Thyroidectomy     Syncope     with severe aortic stenosis, S/P AVR       Social History     Socioeconomic History    Marital status:    Tobacco Use    Smoking status: Never     Passive exposure: Past    Smokeless tobacco: Never   Vaping Use    Vaping status: Never Used   Substance and Sexual Activity    Alcohol use: No    Drug use: No    Sexual activity: Defer       Family History   Problem Relation Age of Onset    Heart disease Mother     Hypertension Mother     Diabetes Mother     Heart disease Father     Other Father         lung problems    Heart disease Other     Hypertension Other     Hyperlipidemia Other        Review of Systems   Constitutional: Positive for malaise/fatigue. Negative for diaphoresis and fever.   Cardiovascular:  Negative for chest pain, near-syncope and palpitations.   Respiratory:  Negative for shortness of breath.   "  Hematologic/Lymphatic: Negative for bleeding problem.   Musculoskeletal:  Positive for joint pain and stiffness.   Neurological:  Positive for loss of balance (uses walker).   Psychiatric/Behavioral:  Positive for depression and memory loss (confusion at times). The patient is nervous/anxious.         BP Readings from Last 5 Encounters:   06/26/25 146/60   12/12/24 142/64   03/21/24 160/64   05/31/23 144/70   11/29/22 160/70       Wt Readings from Last 5 Encounters:   06/26/25 63.5 kg (140 lb)   12/12/24 63.5 kg (140 lb)   03/21/24 62.9 kg (138 lb 9.6 oz)   06/14/23 61.3 kg (135 lb 2.3 oz)   05/31/23 61.3 kg (135 lb 3.2 oz)       Objective     /60 (BP Location: Left arm, Patient Position: Sitting, Cuff Size: Adult)   Pulse 54   Ht 160 cm (63\")   Wt 63.5 kg (140 lb)   BMI 24.80 kg/m²     Vitals and nursing note reviewed.   Constitutional:       Appearance: Not in distress.   HENT:      Head: Normocephalic.   Pulmonary:      Effort: Pulmonary effort is normal.      Breath sounds: Normal breath sounds.   Cardiovascular:      PMI at left midclavicular line. Normal rate. Regular rhythm.      Murmurs: There is a systolic murmur.   Edema:     Ankle: bilateral trace edema of the ankle.  Abdominal:      General: Bowel sounds are normal.      Palpations: Abdomen is soft.   Musculoskeletal:      Cervical back: Neck supple. Skin:     General: Skin is warm and dry.   Neurological:      Mental Status: Alert, oriented to person, place, and time and oriented to person, place and time.            ECG 12 Lead    Date/Time: 6/26/2025 4:19 PM  Performed by: Darcy Flower APRN    Authorized by: Darcy Flower APRN  Comparison: compared with previous ECG from 10/19/2021  Similar to previous ECG  Rhythm: sinus bradycardia  BPM: 54               Assessment & Plan   Diagnoses and all orders for this visit:    1. S/P AVR (Primary)  -     Adult Transthoracic Echo Complete W/ Cont if Necessary Per Protocol; Future    2. " Essential hypertension    3. Bradycardia, sinus  -     ECG 12 Lead    4. Hypercholesteremia        Status post AVR/AV stenosis  -GUANAKITO stable prior echocardiograms  -For surveillance a repeat echocardiogram ordered     Hypertension/sinus bradycardia  -BP stable  -EKG sinus bradycardia, similar to prior  -Continue Lopressor 25 mg daily, lisinopril 10 mg daily, hydrochlorothiazide 12.5 mg daily, amlodipine 5 mg daily     Hypercholesteremia  - Labs per PCP  - Continue rosuvastatin     Further recommendations based on echocardiogram results     6-month follow-up visit scheduled.               Electronically signed by SLIME Alvarez,  June 26, 2025 16:22 EDT    Dictated Utilizing Dragon Dictation: Part of this note may be an electronic transcription/translation of spoken language to printed text using the Dragon Dictation System.

## 2025-07-07 ENCOUNTER — HOSPITAL ENCOUNTER (OUTPATIENT)
Dept: CARDIOLOGY | Facility: HOSPITAL | Age: OVER 89
Discharge: HOME | End: 2025-07-07
Admitting: NURSE PRACTITIONER
Payer: MEDICARE

## 2025-07-07 VITALS — BODY MASS INDEX: 24.8 KG/M2 | HEIGHT: 63 IN | WEIGHT: 139.99 LBS

## 2025-07-07 DIAGNOSIS — Z95.2 S/P AVR: ICD-10-CM

## 2025-07-07 LAB
AORTIC DIMENSIONLESS INDEX: 0.48 (DI)
AV MEAN PRESS GRAD SYS DOP V1V2: 13 MMHG
AV VMAX SYS DOP: 245.4 CM/SEC
BH CV ECHO MEAS - 2D AUTO EF SIEMENS: 66 %
BH CV ECHO MEAS - ACS: 1.44 CM
BH CV ECHO MEAS - AO MAX PG: 24.1 MMHG
BH CV ECHO MEAS - AO ROOT DIAM: 3.4 CM
BH CV ECHO MEAS - AO V2 VTI: 66.6 CM
BH CV ECHO MEAS - AVA(I,D): 1.55 CM2
BH CV ECHO MEAS - EDV(CUBED): 92.2 ML
BH CV ECHO MEAS - ESV(CUBED): 22 ML
BH CV ECHO MEAS - FS: 37.9 %
BH CV ECHO MEAS - IVS/LVPW: 0.94 CM
BH CV ECHO MEAS - IVSD: 0.86 CM
BH CV ECHO MEAS - LA DIMENSION: 4.6 CM
BH CV ECHO MEAS - LAT PEAK E' VEL: 8.7 CM/SEC
BH CV ECHO MEAS - LV MASS(C)D: 131.2 GRAMS
BH CV ECHO MEAS - LV MAX PG: 5.7 MMHG
BH CV ECHO MEAS - LV MEAN PG: 2.9 MMHG
BH CV ECHO MEAS - LV V1 MAX: 119.2 CM/SEC
BH CV ECHO MEAS - LV V1 VTI: 31.9 CM
BH CV ECHO MEAS - LVIDD: 4.5 CM
BH CV ECHO MEAS - LVIDS: 2.8 CM
BH CV ECHO MEAS - LVOT AREA: 3.2 CM2
BH CV ECHO MEAS - LVOT DIAM: 2.03 CM
BH CV ECHO MEAS - LVPWD: 0.91 CM
BH CV ECHO MEAS - MED PEAK E' VEL: 8.3 CM/SEC
BH CV ECHO MEAS - MR MAX PG: 55.6 MMHG
BH CV ECHO MEAS - MR MAX VEL: 372.9 CM/SEC
BH CV ECHO MEAS - MV A MAX VEL: 69.2 CM/SEC
BH CV ECHO MEAS - MV DEC SLOPE: 476.1 CM/SEC2
BH CV ECHO MEAS - MV DEC TIME: 0.17 SEC
BH CV ECHO MEAS - MV E MAX VEL: 90 CM/SEC
BH CV ECHO MEAS - MV E/A: 1.3
BH CV ECHO MEAS - MV MAX PG: 5 MMHG
BH CV ECHO MEAS - MV MEAN PG: 1.47 MMHG
BH CV ECHO MEAS - MV P1/2T: 71.3 MSEC
BH CV ECHO MEAS - MV V2 VTI: 47.8 CM
BH CV ECHO MEAS - MVA(P1/2T): 3.1 CM2
BH CV ECHO MEAS - MVA(VTI): 2.16 CM2
BH CV ECHO MEAS - PA V2 MAX: 94.7 CM/SEC
BH CV ECHO MEAS - PI END-D VEL: 86 CM/SEC
BH CV ECHO MEAS - RAP SYSTOLE: 3 MMHG
BH CV ECHO MEAS - RV MAX PG: 1.82 MMHG
BH CV ECHO MEAS - RV V1 MAX: 67.4 CM/SEC
BH CV ECHO MEAS - RV V1 VTI: 20.5 CM
BH CV ECHO MEAS - RVDD: 2.9 CM
BH CV ECHO MEAS - RVSP: 35 MMHG
BH CV ECHO MEAS - SV(LVOT): 103.4 ML
BH CV ECHO MEAS - SVI(LVOT): 62.2 ML/M2
BH CV ECHO MEAS - TAPSE (>1.6): 2.8 CM
BH CV ECHO MEAS - TR MAX PG: 31.6 MMHG
BH CV ECHO MEAS - TR MAX VEL: 281.1 CM/SEC
BH CV ECHO MEASUREMENTS AVERAGE E/E' RATIO: 10.59
BH CV XLRA - TDI S': 9.7 CM/SEC
LV EF 3D SEGMENTATION: 68 %
SINUS: 3.1 CM

## 2025-07-07 PROCEDURE — 93306 TTE W/DOPPLER COMPLETE: CPT
